# Patient Record
Sex: FEMALE | Race: WHITE | Employment: FULL TIME | URBAN - METROPOLITAN AREA
[De-identification: names, ages, dates, MRNs, and addresses within clinical notes are randomized per-mention and may not be internally consistent; named-entity substitution may affect disease eponyms.]

---

## 2018-01-30 ENCOUNTER — HOSPITAL ENCOUNTER (EMERGENCY)
Age: 28
Discharge: HOME | End: 2018-01-30
Attending: EMERGENCY MEDICINE
Payer: MEDICAID

## 2018-01-30 ENCOUNTER — APPOINTMENT (OUTPATIENT)
Dept: ULTRASOUND IMAGING | Age: 28
End: 2018-01-30
Attending: PHYSICIAN ASSISTANT
Payer: MEDICAID

## 2018-01-30 VITALS
SYSTOLIC BLOOD PRESSURE: 113 MMHG | RESPIRATION RATE: 16 BRPM | DIASTOLIC BLOOD PRESSURE: 80 MMHG | WEIGHT: 182.54 LBS | HEART RATE: 99 BPM | TEMPERATURE: 98.2 F | HEIGHT: 64 IN | OXYGEN SATURATION: 100 % | BODY MASS INDEX: 31.16 KG/M2

## 2018-01-30 DIAGNOSIS — Z3A.01 LESS THAN 8 WEEKS GESTATION OF PREGNANCY: ICD-10-CM

## 2018-01-30 DIAGNOSIS — Z32.01 PREGNANCY TEST PERFORMED, PREGNANCY CONFIRMED: Primary | ICD-10-CM

## 2018-01-30 DIAGNOSIS — Z71.6 TOBACCO ABUSE COUNSELING: ICD-10-CM

## 2018-01-30 DIAGNOSIS — Z86.59 HISTORY OF DEPRESSION: ICD-10-CM

## 2018-01-30 LAB
AMORPH CRY URNS QL MICRO: ABNORMAL
APPEARANCE UR: ABNORMAL
BACTERIA URNS QL MICRO: NEGATIVE /HPF
BILIRUB UR QL: NEGATIVE
CLUE CELLS VAG QL WET PREP: NORMAL
COLOR UR: ABNORMAL
EPITH CASTS URNS QL MICRO: ABNORMAL /LPF
GLUCOSE UR STRIP.AUTO-MCNC: NEGATIVE MG/DL
HCG SERPL-ACNC: ABNORMAL MIU/ML (ref 0–6)
HCG UR QL: POSITIVE
HGB UR QL STRIP: NEGATIVE
KETONES UR QL STRIP.AUTO: NEGATIVE MG/DL
KOH PREP SPEC: NORMAL
LEUKOCYTE ESTERASE UR QL STRIP.AUTO: NEGATIVE
MUCOUS THREADS URNS QL MICRO: ABNORMAL /LPF
NITRITE UR QL STRIP.AUTO: NEGATIVE
PH UR STRIP: 7 [PH] (ref 5–8)
PROT UR STRIP-MCNC: NEGATIVE MG/DL
RBC #/AREA URNS HPF: ABNORMAL /HPF (ref 0–5)
SERVICE CMNT-IMP: NORMAL
SP GR UR REFRACTOMETRY: 1.02 (ref 1–1.03)
T VAGINALIS VAG QL WET PREP: NORMAL
UA: UC IF INDICATED,UAUC: ABNORMAL
UROBILINOGEN UR QL STRIP.AUTO: 1 EU/DL (ref 0.2–1)
WBC URNS QL MICRO: ABNORMAL /HPF (ref 0–4)

## 2018-01-30 PROCEDURE — 76817 TRANSVAGINAL US OBSTETRIC: CPT

## 2018-01-30 PROCEDURE — 87491 CHLMYD TRACH DNA AMP PROBE: CPT | Performed by: PHYSICIAN ASSISTANT

## 2018-01-30 PROCEDURE — 87210 SMEAR WET MOUNT SALINE/INK: CPT | Performed by: PHYSICIAN ASSISTANT

## 2018-01-30 PROCEDURE — 36415 COLL VENOUS BLD VENIPUNCTURE: CPT | Performed by: PHYSICIAN ASSISTANT

## 2018-01-30 PROCEDURE — 81025 URINE PREGNANCY TEST: CPT | Performed by: PHYSICIAN ASSISTANT

## 2018-01-30 PROCEDURE — 99284 EMERGENCY DEPT VISIT MOD MDM: CPT

## 2018-01-30 PROCEDURE — 84702 CHORIONIC GONADOTROPIN TEST: CPT | Performed by: PHYSICIAN ASSISTANT

## 2018-01-30 PROCEDURE — 81001 URINALYSIS AUTO W/SCOPE: CPT | Performed by: PHYSICIAN ASSISTANT

## 2018-01-30 NOTE — Clinical Note
[facility] SYSTEMS Departments For adult and child immunizations, family planning, TB screening, STD testing and women's health services. [facility]: [place] [phone] [place] [phone] [facility]ly [facility]   [phone] [facility]   [phone] [facility]   [phone] [place]: [facility] [phone] [place] [phone] [place] [phone] [address] For primary care services, woman and child wellness, and some clinic s providing specialty care. [facility] -- 1011 [facility]. [address] [place] [address] [phone] [address] [place] [address] 200 [facility] [address] [place] [address] [phone] [address] [phone] [facility]8 [facility] 5850 [facility]  [phone] 7700 [facility] [address] [place] [phone] [facility] 81 [facility] [phone] [facility] 1051 [facility], 809 [facility]: [facility] [address], ext 320 1509 [facility] [address] [phone] [place] [address] [phone] [address] 5850 [facility]  [phone] [employer]  [address], [phone] 3550 [facility] ([email or web address]) [phone] Sexual Health/Woman Wellness Clinics For STD/HIV testing and treatment, pregnancy testing and services, men's health, birth control services, LGBT services, and hepatitis/HPV vaccine services. Danielle for [place] [employer] 201 [address]. [facility] [phone] [facility] [address] [facility] [phone] [address] [facility] [phone] [address] [place] [address] Rd, 5th floor [phone] [facility] o myriam [place] 5841 [facility] [phone] [facility] [address] [facility] [address]. Rehana [place] [phone] Specialty Service Clinics 112 [facility] [phone] 6655 [facility]   [phone] [place]   [phone] Women[facility] Inf[facility] Children[facility] Services: [address] [phone] [address] [phone] 128 [facility] 85 7926 [facility]     [phone] 1325 [facility]   [phone] 561 [facility] [phone] [facility] [address] [place] [address] Physicians [phone] MD Storm Zamora MD Pascal Blanks, MD Boyd Estrin [facility] Doctors [phone] Tahir Velasquez, P MD Jairo Quintana MD Dallie Reek, MD 
[place] [address] [phone] Karla Ebony, MD Saray Estradahine [facility] [phone] MD Andrés Key MD Brinda Leep, MD Allyn Boyer, MD 
[facility] [phone] MD Randi Leung MD [place], DELFINO Graham [phone] MD Jassi Calero MD Jeoffrey Coria, M D Seward Kettle, MD Lenora Martinez, MD Suzie Campi, MD Celena Spalding, [address] [place] [address] [phone] Jase Matta MD [facility] [phone] MD Steve Umaña, MD Elke Hernandez MD Emily Blamer, MD Eliza Roy, MD Ed Bonus, MD 
[facility] [phone] MD Luz Arias, FNP Danny Suarez, MD Jerome Levi MD Denney Gourd, MD Ola Lay, MD [facility] [phone] MD Douglas Underwood MD Nino Philips, MD Adela Guerra Si, MD 
[facility] [phone] MD Adrian Laureano MD [facility] [place] [phone] MD Chasity Camacho, MD Silvia Landry MD 
6260 [facility] [phone] Bell Allen, MD Svitlana Garrett, MD Mikey Runner, MD Glen Jackson, MD Maite Celis, MD Jami Lake, NP Bev Diaz MD 9978 [facility], [facility] [phone] MD John French, MD [place] [facility] [phone] MD Darron Dey, FNP Minh May, PACLAUDIO May,  FNP Bipin Coronado, MD Edwige Malin, NP Ines Bermudez, DO Miscellaneous: 
Litzy Alston [phone]

## 2018-01-30 NOTE — DISCHARGE INSTRUCTIONS
Getting Ready for Baby: Care Instructions  Your Care Instructions    Congratulations! You are heading into an exciting adventure. You can make your entry into parenthood a little less hectic by planning now and gathering some of the items you will need. You will be too busy (and probably too tired) to do much shopping after the baby arrives. These tips will help you get started. Some items you may need to buy, but do not be shy about taking donations of clothes and other items from family and friends. Getting an early start can allow you to stock up over time, which might be easier on your wallet. Follow-up care is a key part of your treatment and safety. Be sure to make and go to all appointments, and call your doctor if you are having problems. What do you need to have at home? Freeze meals ahead  · Try to cook and freeze meals in the weeks before the baby comes. Family and friends may be able to help cook meals. You may not have the time or the energy to cook in the first weeks after the baby arrives. Baby furniture and car seat  · Make sure all the baby gear you buy meets safety standards set by the [place] Consumer Product Safety Commission. Although most new items will likely meet these standards, older and used items may not. Equipment that has been used before may not be safe. ¨ Cribs should have less than 2.4 inches of space between the slats. Lower the mattress as your baby grows. Your baby may try to climb out of the crib if the mattress is not lowered. ¨ Baby walkers should not be used, according to recommendations from the [employer] of [employer]. ¨ Playpens should have spaces in the mesh material that are no greater than ¼-inch across. Wood slats should be less than 2.4 inches apart. Look for a playpen or travel crib that has top rails that lock into position. This may prevent the rail from collapsing.   ¨ Stroller wheels should be in a locked position before you put your baby in the stroller. Use the straps to secure your baby so that your baby cannot lean out as he or she gets more mobile. Fasten any toys or bumpers so that they do not fall on your baby. Remove these items as soon as your infant can sit or get up on all fours. Make sure releases and hinges are out of your baby's reach, especially if the stroller can fold. ¨ High chairs should have a wide, stable base. Do not use booster seats that attach to the table. Always make sure the high chair is locked in the upright position before use. Use the safety straps, and stay with your baby at all times while he or she is in the high chair. ¨ Changing tables should have a railing on all sides that is 2 inches high. Try to use a slightly indented changing pad. Always use the safety strap, and keep one hand on your baby. Have diapers and other items handy, but keep them out of your baby's reach. (If you do not have a changing table, you can change your baby on a towel on the floor.)  · Get a new car seat and put your baby in it every time you drive with him or her. Getting a new seat is the best way to make sure that a seat meets safety standards and has not already been used in an accident. ¨ Make sure the car seat is properly installed. See the maker's instructions. If you are not sure how to put in the car seat, have your car seat checked at a police station. ¨ Make sure the car seat is the right fit for your child's current age, weight, or height. For safety, it is very important to have a car seat that fits your child. And it is safest for the seat to face the rear until your child is age 3 or nearly 2.  ¨ Put the car seat in a rear seat, not in the front passenger seat. This will keep your child from getting hurt by the air bag in an accident. If you have rear side air bags, put your child's car seat in the middle seat.   ¨ For more information about car seats, call the [employer] at 1-421-684-491-269-9939. Baby care items  · Start stocking up on disposable diapers (unless you plan to use cloth diapers) and wipes. If you want, you can buy a few packages of  diapers, which come with a cutout in the front so the diaper does not touch the baby's umbilical cord stump. You also can buy regular infant diapers and roll down the front. · You may get some baby clothes from family and friends at baby showers and after the baby arrives. Ask for (or buy) a few basics to get you started. Get several sleep sacks or nightgowns, T-shirts that snap at the crotch (called \"onesies\"), small blankets to wrap the baby in (called receiving blankets), and one-piece outfits that snap or zip down one leg. This is so that you do not have to take off all the baby's clothes to change a diaper. Socks or booties are also a good idea to keep your baby's feet warm. Get a cotton cap or two. Newborns also need their heads covered to stay warm. · Put together a kit of baby care items. Include diaper rash cream, baby nail clippers, a nasal bulb syringe (to help clear a stuffy nose), and baby wash, which also can be used as shampoo. · If you plan to breastfeed, buy a couple of nursing bras. You can wear one while the other one is in the wash. Also, get a nipple cream that contains lanolin to prevent cracked or sore nipples. Some women also like to put nursing pads in their bras to prevent breast-milk from leaking onto their clothes. · If you plan to bottle-feed, buy several cans of formula and several bottles to get you started. Where can you learn more? Go to http://queta-maxine.info/. Enter O361 in the search box to learn more about \"Getting Ready for Baby: Care Instructions. \"  Current as of: May 12, 2017  Content Version: 11.4  © 0717-7596 [employer]. Care instructions adapted under license by [employer] (which disclaims liability or warranty for this information).  If you have questions about a medical condition or this instruction, always ask your healthcare professional. [address] any warranty or liability for your use of this information. Learning About Pregnancy  Your Care Instructions    Your health in the early weeks of your pregnancy is particularly important for your baby's health. Take good care of yourself. Anything you do that harms your body can also harm your baby. Make sure to go to all of your doctor appointments. Regular checkups will help keep you and your baby healthy. How can you care for yourself at home? Diet  ? · Eat a balanced diet. Make sure your diet includes plenty of beans, peas, and leafy green vegetables. ? · Do not skip meals or go for many hours without eating. If you are nauseated, try to eat a small, healthy snack every 2 to 3 hours. ? · Do not eat fish that has a high level of mercury, such as shark, swordfish, or mackerel. Do not eat more than one can of tuna each week. ? · Drink plenty of fluids, enough so that your urine is light yellow or clear like water. If you have kidney, heart, or liver disease and have to limit fluids, talk with your doctor before you increase the amount of fluids you drink. ? · Cut down on caffeine, such as coffee, tea, and cola. ? · Do not drink alcohol, such as beer, wine, or hard liquor. ? · Take a multivitamin that contains at least 400 micrograms (mcg) of folic acid to help prevent birth defects. Fortified cereal and whole wheat bread are good additional sources of folic acid. ? · Increase the calcium in your diet. Try to drink a quart of skim milk each day. You may also take calcium supplements and choose foods such as cheese and yogurt. ? Lifestyle  ? · Make sure you go to your follow-up appointments. ? · Get plenty of rest. You may be unusually tired while you are pregnant. ? · Get at least 30 minutes of exercise on most days of the week. Walking is a good choice.  If you have not exercised in the past, start out slowly. Take several short walks each day. ? · Do not smoke. If you need help quitting, talk to your doctor about stop-smoking programs. These can increase your chances of quitting for good. ? · Do not touch cat feces or litter boxes. Also, wash your hands after you handle raw meat, and fully cook all meat before you eat it. Wear gloves when you work in the yard or garden, and wash your hands well when you are done. Cat feces, raw or undercooked meat, and contaminated dirt can cause an infection that may harm your baby or lead to a miscarriage. ? · Do not use saunas or hot tubs. Raising your body temperature may harm your baby. ? · Avoid chemical fumes, paint fumes, or poisons. ? · Do not use illegal drugs or alcohol. Medicines  ? · Review all of your medicines with your doctor. Some of your routine medicines may need to be changed to protect your baby. ? · Use acetaminophen (Tylenol) to relieve minor problems, such as a mild headache or backache or a mild fever with cold symptoms. Do not use nonsteroidal anti-inflammatory drugs (NSAIDs), such as ibuprofen (Advil, Motrin) or naproxen (Aleve), unless your doctor says it is okay. ? · Do not take two or more pain medicines at the same time unless the doctor told you to. Many pain medicines have acetaminophen, which is Tylenol. Too much acetaminophen (Tylenol) can be harmful. ? · Take your medicines exactly as prescribed. Call your doctor if you think you are having a problem with your medicine. ?To manage morning sickness  ? · If you feel sick when you first wake up, try eating a small snack (such as crackers) before you get out of bed. Allow some time to digest the snack, and then get out of bed slowly. ? · Do not skip meals or go for long periods without eating. An empty stomach can make nausea worse. ? · Eat small, frequent meals instead of three large meals each day. ? · Drink plenty of fluids. Sports drinks, such as Gatorade or Powerade, are good choices. ? · Eat foods that are high in protein but low in fat. ? · If you are taking iron supplements, ask your doctor if they are necessary. Iron can make nausea worse. ? · Avoid any smells, such as coffee, that make you feel sick. ? · Get lots of rest. Morning sickness may be worse when you are tired. Follow-up care is a key part of your treatment and safety. Be sure to make and go to all appointments, and call your doctor if you are having problems. It's also a good idea to know your test results and keep a list of the medicines you take. Where can you learn more? Go to http://queta-maxine.info/. Enter Y223 in the search box to learn more about \"Learning About Pregnancy. \"  Current as of: March 16, 2017  Content Version: 11.4  © 7430-9003 [employer]. Care instructions adapted under license by [employer] (which disclaims liability or warranty for this information). If you have questions about a medical condition or this instruction, always ask your healthcare professional. [address] any warranty or liability for your use of this information.

## 2018-01-30 NOTE — ED PROVIDER NOTES
EMERGENCY DEPARTMENT HISTORY AND PHYSICAL EXAM      Date: 2018  Patient Name: Yuli Alarcon    History of Presenting Illness     Chief Complaint   Patient presents with    Pregnancy Test     Patient reports having a positive pregnancy at home and wants it confirmed today. History Provided By: Patient    HPI: Yuli Alarcon, 33 y.o. female (/A0) with PMHx significant for drug addiction, EtOH abuse, and hepatitis C, presents ambulatory to the ED with cc of mild, cramping, lower abdominal pain x 1 week. She also c/o two episodes of vaginal bleeding over the past 1.5 weeks. She states that she visualized blood after wiping, but she denies using panty-liners or pads. She also c/o minimal vaginal discharge, but she denies any abnormal color/odor. She notes that she recently gave vaginal birth 3 months ago in [place], and recently moved to [place] 1 month ago. She denies any complications with her previous pregnancy. She states that she had a few positive home pregnancy tests, but she sought ED evaluation today for confirmation. She denies receiving a menstrual cycle after her previous pregnancy. She also denies having PCP or an OB/GYN established in [place]. She is unsure how far along she is in her current pregnancy. She denies a history of miscarriages, taking birth control after her recent vaginal delivery, exposure to STIs, or currently breast feeding. She specifically denies fevers, dysuria, hematuria, or other complaints at this time. There are no other complaints, changes, or physical findings at this time. PCP: None    Current Outpatient Prescriptions   Medication Sig Dispense Refill    acetaminophen (TYLENOL EXTRA STRENGTH) 500 mg tablet Take 2 Tabs by mouth every six (6) hours as needed for Pain or Fever. 30 Tab 0    ibuprofen (MOTRIN) 600 mg tablet Take 1 Tab by mouth every six (6) hours as needed for Pain (Fever).  20 Tab 0    fluoxetine (PROZAC) 10 mg capsule Take 10 mg by mouth daily.  lithium carbonate (LITHOBID) 600 mg capsule Take 600 mg by mouth daily.  FLUPHENAZINE HCL (PROLIXIN PO) Take  by mouth. 2 tablets at bedtime       ziprasidone (GEODON) 40 mg capsule Take 40 mg by mouth two (2) times daily (with meals).  BENZTROPINE MESYLATE (BENZTROPINE PO) Take  by mouth. One tablet in the AM and 2 at bedtime          Past History     Past Medical History:  Past Medical History:   Diagnosis Date    [facility]     [facility]     Hepatitis C     Psychiatric disorder     Psychiatric disorder        Past Surgical History:  Past Surgical History:   Procedure Laterality Date    HX ACL RECONSTRUCTION      right knee    HX TONSILLECTOMY      INNER EAR SURGERY PROC UNLISTED         Family History:  History reviewed. No pertinent family history. Social History:  Social History   Substance Use Topics    Smoking status: Current Every Day Smoker    Smokeless tobacco: Never Used    Alcohol use No      Comment: No alcohol in 4 months       Allergies: Allergies   Allergen Reactions    Tylenol [Acetaminophen] Other (comments)     Hepatitis        Review of Systems   Review of Systems   Constitutional: Negative. Negative for activity change, appetite change, chills, diaphoresis, fever and unexpected weight change. HENT: Negative for congestion, hearing loss, rhinorrhea, sinus pressure, sneezing, sore throat and trouble swallowing. Eyes: Negative for pain, redness, itching and visual disturbance. Respiratory: Negative for cough, shortness of breath and wheezing. Cardiovascular: Negative for chest pain, palpitations and leg swelling. Gastrointestinal: Positive for abdominal pain. Negative for constipation, diarrhea, nausea and vomiting. Genitourinary: Positive for vaginal bleeding and vaginal discharge. Negative for dysuria and hematuria. Musculoskeletal: Negative for arthralgias, gait problem and myalgias.    Skin: Negative for color change, pallor, rash and wound. Neurological: Negative for tremors, weakness, light-headedness, numbness and headaches. All other systems reviewed and are negative. Physical Exam   Physical Exam   Constitutional: She is oriented to person, place, and time. Vital signs are normal. She appears well-developed and well-nourished. No distress. 33 y.o.  female in NAD  Communicates appropriately and in full sentences  Normal vital signs   HENT:   Head: Normocephalic and atraumatic. Eyes: Conjunctivae are normal. Pupils are equal, round, and reactive to light. Right eye exhibits no discharge. Left eye exhibits no discharge. Neck: Normal range of motion. Neck supple. No nuchal rigidity   Cardiovascular: Normal rate, regular rhythm and intact distal pulses. Pulmonary/Chest: Effort normal and breath sounds normal. No respiratory distress. She has no wheezes. Abdominal: Soft. Bowel sounds are normal. She exhibits no distension. There is no tenderness. Genitourinary:   Genitourinary Comments: Pelvic Exam: No vaginal bleeding. Cervical os closed. Moderate amount of thick, white vaginal discharge. No CMT, no adnexal tenderness. Musculoskeletal: Normal range of motion. She exhibits no edema, tenderness or deformity. No neurologic, motor, vascular, or compartment embarrassment observed on exam. No focal neurologic deficits. Neurological: She is alert and oriented to person, place, and time. Coordination normal.   Skin: Skin is warm and dry. No rash noted. She is not diaphoretic. No erythema. No pallor. Psychiatric: She has a normal mood and affect. Nursing note and vitals reviewed.       Diagnostic Study Results   Labs -     Recent Results (from the past 12 hour(s))   URINALYSIS W/ REFLEX CULTURE    Collection Time: 01/30/18 12:58 PM   Result Value Ref Range    Color YELLOW/STRAW      Appearance CLOUDY (A) CLEAR      Specific gravity 1.022 1.003 - 1.030      pH (UA) 7.0 5.0 - 8.0 Protein NEGATIVE  NEG mg/dL    Glucose NEGATIVE  NEG mg/dL    Ketone NEGATIVE  NEG mg/dL    Bilirubin NEGATIVE  NEG      Blood NEGATIVE  NEG      Urobilinogen 1.0 0.2 - 1.0 EU/dL    Nitrites NEGATIVE  NEG      Leukocyte Esterase NEGATIVE  NEG      WBC 0-4 0 - 4 /hpf    RBC 0-5 0 - 5 /hpf    Epithelial cells FEW FEW /lpf    Bacteria NEGATIVE  NEG /hpf    UA:UC IF INDICATED CULTURE NOT INDICATED BY UA RESULT CNI      Mucus TRACE (A) NEG /lpf    Amorphous Crystals 2+ (A) NEG   HCG URINE, QL    Collection Time: 01/30/18 12:58 PM   Result Value Ref Range    HCG urine, QL POSITIVE (A) NEG     BETA HCG, QT    Collection Time: 01/30/18  1:28 PM   Result Value Ref Range    [place] [address] (H) 0 - 6 MIU/ML   OTTONIEL, OTHER SOURCES    Collection Time: 01/30/18  1:50 PM   Result Value Ref Range    Special Requests: NO SPECIAL REQUESTS      KOH NO YEAST SEEN     WET PREP    Collection Time: 01/30/18  1:50 PM   Result Value Ref Range    Clue cells CLUE CELLS ABSENT      Wet prep NO TRICHOMONAS SEEN         Radiologic Studies -   INDICATION: Vaginal bleeding and contractions     COMPARISON: none     The patient was studied with real-time ultrasound using transvaginal technique  only. . Examination was performed as an emergency procedure. Fetal examination  was not performed.        Transvaginal:     The gestational sac shape and fluid within it are appropriate for gestational  age. Fetal pole is seen. Crown rump length is 2 mm corresponding to  approximately 5 weeks 5 days. The gestational sac averages 1.3 cm correlating  with 5 weeks 3 days gestational age. . Cardiac flicker is 644 bpm.  Placental  implantation site cannot be defined since the gestation is early. Uterine size  is 8.9 x 4.7 x 5.8 cm. Right ovary is 1.7 x 2.0 x 1.3 cm.   Left ovary is 3.1 x  2.3 x 2.1 cm.     No abnormal fluid collections seen.     IMPRESSION  IMPRESSION:   Living intrauterine gestation with best estimate of gestational age at  approximately 10 weeks 11 days.  No complication of pregnancy seen.                    Medical Decision Making   I am the first provider for this patient. I reviewed the vital signs, available nursing notes, past medical history, past surgical history, family history and social history. Vital Signs-Reviewed the patient's vital signs. Patient Vitals for the past 12 hrs:   Temp Pulse Resp BP SpO2   01/30/18 1249 98.2 °F (36.8 °C) 99 16 113/80 100 %       Records Reviewed: Nursing Notes and Old Medical Records    Provider Notes (Medical Decision Making):   DDx: Pregnancy, threatened AB, ectopic pregnancy, ovarian cyst, hemorrhagic cyst, BV, chlamydia, gonorrhea, trichomoniasis, PID, UTI. Pt presents with deire for confirmation of pregnancy. Pregnancy test positive. Pt also reports ?vaginal bleeding and lower cramping. HCG quant and ultrasound ordered. No acute findings. Pt appears comfortable on exam and is in no abdominal pain. Will discharge with close OB follow-up. Pt provided with print-out of her results so she can acquire Medicare insurance for her pregnancy. Urged pt to quit smoking and begin taking prenatal vitamins. ED Course:   Initial assessment performed. The patients presenting problems have been discussed, and they are in agreement with the care plan formulated and outlined with them. I have encouraged them to ask questions as they arise throughout their visit. Progress Note:  1:25 PM  The patient was informed of her positive pregnancy test, and she conveys her understanding of this result. Given her abdominal cramping and vaginal bleeding, pt was informed of the plan for an ultrasound and pelvic exam. She is in agreement with this plan. Written by KIKO Connolly, as dictated by Farnaz Longoria PA-C. Procedure Note - Pelvic Exam:    1:50 PM  Performed by: Farnaz Longoria PA-C  Chaperoned by: ED RN  Pelvic exam was performed using bimanual and speculum.  Further findings noted in physical exam. The procedure took 1-15 minutes, and pt tolerated well. Progress Note:  3:20 PM  At time of discharge, the patient was informed of her ultrasound results. She conveys her understanding of these results. Pt was encouraged to establish follow up with OB/GYN, and she conveys her understanding of this recommendation. Pt was given a list of local OB/GYN offices. Pt was given customary return precautions. Pt is stable for discharge. Written by Yohana Zhu, ED Scribe, as dictated by Nohemi Vincent PA-C. Critical Care Time: 0 minutes    Discharge Note:  3:21 PM  The pt is ready for discharge. The pt's signs, symptoms, diagnosis, and discharge instructions have been discussed and pt has conveyed their understanding. The pt is to follow up as recommended or return to ER should their symptoms worsen. Plan has been discussed and pt is in agreement. PLAN:  1. Discharge Medication List as of 1/30/2018  3:18 PM        2. Follow-up Information     Follow up With Details Comments Contact Info    None Schedule an appointment as soon as possible for a visit in 2 days As needed, If symptoms worsen, Possible further evaluation and treatment None [phone] Patient stated that they have no PCP      [facility] EM[facility]PT Go to As needed, If symptoms worsen [facility]     [address] [place] [address] Schedule an appointment as soon as possible for a visit in 2 days As needed, If symptoms worsen, Possible further evaluation and treatment [id number] [address] [facility] an appointment as soon as possible for a visit in 2 days As needed, If symptoms worsen, Possible further evaluation and treatment [phone] [facility] [address]          Return to ED if worse     Diagnosis     Clinical Impression:   1. Pregnancy test performed, pregnancy confirmed    2.  Less than 8 weeks gestation of pregnancy    3. Tobacco abuse counseling    4. History of depression        Attestations: This note is prepared by Amparo Vital, acting as a Scribe for [place], [employer]. [employer], PA-C: The scribe's documentation has been prepared under my direction and personally reviewed by me in its entirety. I confirm that the notes above accurately reflects all work, treatment, procedures, and medical decision making performed by me.    2:22 PM  I was personally available for consultation in the emergency department. I have reviewed the chart and agree with the documentation recorded by the [facility], including the assessment, treatment plan, and disposition. Cherelle Kwon MD          This note will not be viewable in [address]

## 2018-01-31 LAB
C TRACH DNA SPEC QL NAA+PROBE: NEGATIVE
N GONORRHOEA DNA SPEC QL NAA+PROBE: NEGATIVE
SAMPLE TYPE: NORMAL
SERVICE CMNT-IMP: NORMAL
SPECIMEN SOURCE: NORMAL

## 2018-03-07 ENCOUNTER — HOSPITAL ENCOUNTER (EMERGENCY)
Age: 28
Discharge: HOME | End: 2018-03-07
Attending: EMERGENCY MEDICINE
Payer: MEDICAID

## 2018-03-07 VITALS
OXYGEN SATURATION: 99 % | DIASTOLIC BLOOD PRESSURE: 82 MMHG | TEMPERATURE: 97.8 F | HEART RATE: 97 BPM | HEIGHT: 64 IN | BODY MASS INDEX: 31.35 KG/M2 | RESPIRATION RATE: 20 BRPM | SYSTOLIC BLOOD PRESSURE: 124 MMHG | WEIGHT: 183.64 LBS

## 2018-03-07 DIAGNOSIS — R19.7 DIARRHEA, UNSPECIFIED TYPE: Primary | ICD-10-CM

## 2018-03-07 DIAGNOSIS — Z3A.12 12 WEEKS GESTATION OF PREGNANCY: ICD-10-CM

## 2018-03-07 LAB
ALBUMIN SERPL-MCNC: 3.4 G/DL (ref 3.5–5)
ALBUMIN/GLOB SERPL: 0.9 {RATIO} (ref 1.1–2.2)
ALP SERPL-CCNC: 81 U/L (ref 45–117)
ALT SERPL-CCNC: 84 U/L (ref 12–78)
ANION GAP SERPL CALC-SCNC: 9 MMOL/L (ref 5–15)
APPEARANCE UR: CLEAR
AST SERPL-CCNC: 32 U/L (ref 15–37)
BACTERIA URNS QL MICRO: NEGATIVE /HPF
BASOPHILS # BLD: 0 K/UL (ref 0–0.1)
BASOPHILS NFR BLD: 0 % (ref 0–1)
BILIRUB SERPL-MCNC: 0.2 MG/DL (ref 0.2–1)
BILIRUB UR QL: NEGATIVE
BUN SERPL-MCNC: 11 MG/DL (ref 6–20)
BUN/CREAT SERPL: 19 (ref 12–20)
CALCIUM SERPL-MCNC: 8.6 MG/DL (ref 8.5–10.1)
CHLORIDE SERPL-SCNC: 104 MMOL/L (ref 97–108)
CO2 SERPL-SCNC: 26 MMOL/L (ref 21–32)
COLOR UR: NORMAL
CREAT SERPL-MCNC: 0.57 MG/DL (ref 0.55–1.02)
DIFFERENTIAL METHOD BLD: NORMAL
EOSINOPHIL # BLD: 0.1 K/UL (ref 0–0.4)
EOSINOPHIL NFR BLD: 2 % (ref 0–7)
EPITH CASTS URNS QL MICRO: NORMAL /LPF
ERYTHROCYTE [DISTWIDTH] IN BLOOD BY AUTOMATED COUNT: 13.4 % (ref 11.5–14.5)
GLOBULIN SER CALC-MCNC: 4 G/DL (ref 2–4)
GLUCOSE SERPL-MCNC: 75 MG/DL (ref 65–100)
GLUCOSE UR STRIP.AUTO-MCNC: NEGATIVE MG/DL
HCT VFR BLD AUTO: 37.9 % (ref 35–47)
HGB BLD-MCNC: 12.3 G/DL (ref 11.5–16)
HGB UR QL STRIP: NEGATIVE
HYALINE CASTS URNS QL MICRO: NORMAL /LPF (ref 0–5)
IMM GRANULOCYTES # BLD: 0 K/UL (ref 0–0.04)
IMM GRANULOCYTES NFR BLD AUTO: 0 % (ref 0–0.5)
KETONES UR QL STRIP.AUTO: NEGATIVE MG/DL
LEUKOCYTE ESTERASE UR QL STRIP.AUTO: NEGATIVE
LYMPHOCYTES # BLD: 2.3 K/UL (ref 0.8–3.5)
LYMPHOCYTES NFR BLD: 30 % (ref 12–49)
MCH RBC QN AUTO: 29.1 PG (ref 26–34)
MCHC RBC AUTO-ENTMCNC: 32.5 G/DL (ref 30–36.5)
MCV RBC AUTO: 89.8 FL (ref 80–99)
MONOCYTES # BLD: 0.4 K/UL (ref 0–1)
MONOCYTES NFR BLD: 6 % (ref 5–13)
NEUTS SEG # BLD: 4.8 K/UL (ref 1.8–8)
NEUTS SEG NFR BLD: 63 % (ref 32–75)
NITRITE UR QL STRIP.AUTO: NEGATIVE
NRBC # BLD: 0 K/UL (ref 0–0.01)
NRBC BLD-RTO: 0 PER 100 WBC
PH UR STRIP: 5.5 [PH] (ref 5–8)
PLATELET # BLD AUTO: 242 K/UL (ref 150–400)
PMV BLD AUTO: 10.7 FL (ref 8.9–12.9)
POTASSIUM SERPL-SCNC: 3.2 MMOL/L (ref 3.5–5.1)
PROT SERPL-MCNC: 7.4 G/DL (ref 6.4–8.2)
PROT UR STRIP-MCNC: NEGATIVE MG/DL
RBC # BLD AUTO: 4.22 M/UL (ref 3.8–5.2)
RBC #/AREA URNS HPF: NORMAL /HPF (ref 0–5)
SODIUM SERPL-SCNC: 139 MMOL/L (ref 136–145)
SP GR UR REFRACTOMETRY: 1.02 (ref 1–1.03)
UA: UC IF INDICATED,UAUC: NORMAL
UROBILINOGEN UR QL STRIP.AUTO: 0.2 EU/DL (ref 0.2–1)
WBC # BLD AUTO: 7.7 K/UL (ref 3.6–11)
WBC URNS QL MICRO: NORMAL /HPF (ref 0–4)

## 2018-03-07 PROCEDURE — 93005 ELECTROCARDIOGRAM TRACING: CPT

## 2018-03-07 PROCEDURE — 36415 COLL VENOUS BLD VENIPUNCTURE: CPT | Performed by: EMERGENCY MEDICINE

## 2018-03-07 PROCEDURE — 74011250637 HC RX REV CODE- 250/637: Performed by: EMERGENCY MEDICINE

## 2018-03-07 PROCEDURE — 99285 EMERGENCY DEPT VISIT HI MDM: CPT

## 2018-03-07 PROCEDURE — 85025 COMPLETE CBC W/AUTO DIFF WBC: CPT | Performed by: EMERGENCY MEDICINE

## 2018-03-07 PROCEDURE — 81001 URINALYSIS AUTO W/SCOPE: CPT | Performed by: EMERGENCY MEDICINE

## 2018-03-07 PROCEDURE — 80053 COMPREHEN METABOLIC PANEL: CPT | Performed by: EMERGENCY MEDICINE

## 2018-03-07 RX ORDER — FAMOTIDINE 20 MG/1
20 TABLET, FILM COATED ORAL 2 TIMES DAILY
Status: DISCONTINUED | OUTPATIENT
Start: 2018-03-07 | End: 2018-03-08 | Stop reason: HOSPADM

## 2018-03-07 RX ADMIN — FAMOTIDINE 20 MG: 20 TABLET, FILM COATED ORAL at 20:57

## 2018-03-08 LAB
ATRIAL RATE: 100 BPM
CALCULATED P AXIS, ECG09: 34 DEGREES
CALCULATED R AXIS, ECG10: 48 DEGREES
CALCULATED T AXIS, ECG11: 7 DEGREES
DIAGNOSIS, 93000: NORMAL
P-R INTERVAL, ECG05: 146 MS
Q-T INTERVAL, ECG07: 352 MS
QRS DURATION, ECG06: 80 MS
QTC CALCULATION (BEZET), ECG08: 454 MS
VENTRICULAR RATE, ECG03: 100 BPM

## 2018-03-08 NOTE — DISCHARGE INSTRUCTIONS
Diarrhea: Care Instructions  Your Care Instructions    Diarrhea is loose, watery stools (bowel movements). The exact cause is often hard to find. Sometimes diarrhea is your body's way of getting rid of what caused an upset stomach. Viruses, food poisoning, and many medicines can cause diarrhea. Some people get diarrhea in response to emotional stress, anxiety, or certain foods. Almost everyone has diarrhea now and then. It usually isn't serious, and your stools will return to normal soon. The important thing to do is replace the fluids you have lost, so you can prevent dehydration. The doctor has checked you carefully, but problems can develop later. If you notice any problems or new symptoms, get medical treatment right away. Follow-up care is a key part of your treatment and safety. Be sure to make and go to all appointments, and call your doctor if you are having problems. It's also a good idea to know your test results and keep a list of the medicines you take. How can you care for yourself at home? · Watch for signs of dehydration, which means your body has lost too much water. Dehydration is a serious condition and should be treated right away. Signs of dehydration are:  ¨ Increasing thirst and dry eyes and mouth. ¨ Feeling faint or lightheaded. ¨ Darker urine, and a smaller amount of urine than normal.  · To prevent dehydration, drink plenty of fluids, enough so that your urine is light yellow or clear like water. Choose water and other caffeine-free clear liquids until you feel better. If you have kidney, heart, or liver disease and have to limit fluids, talk with your doctor before you increase the amount of fluids you drink. · Begin eating small amounts of mild foods the next day, if you feel like it. ¨ Try yogurt that has live cultures of Lactobacillus. (Check the label.)  ¨ Avoid spicy foods, fruits, alcohol, and caffeine until 48 hours after all symptoms are gone.   ¨ Avoid chewing gum that contains sorbitol. ¨ Avoid dairy products (except for yogurt with Lactobacillus) while you have diarrhea and for 3 days after symptoms are gone. · The doctor may recommend that you take over-the-counter medicine, such as loperamide (Imodium), if you still have diarrhea after 6 hours. Read and follow all instructions on the label. Do not use this medicine if you have bloody diarrhea, a high fever, or other signs of serious illness. Call your doctor if you think you are having a problem with your medicine. When should you call for help? Call 911 anytime you think you may need emergency care. For example, call if:  ? · You passed out (lost consciousness). ? · Your stools are maroon or very bloody. ?Call your doctor now or seek immediate medical care if:  ? · You are dizzy or lightheaded, or you feel like you may faint. ? · Your stools are black and look like tar, or they have streaks of blood. ? · You have new or worse belly pain. ? · You have symptoms of dehydration, such as:  ¨ Dry eyes and a dry mouth. ¨ Passing only a little dark urine. ¨ Feeling thirstier than usual.   ? · You have a new or higher fever. ? Watch closely for changes in your health, and be sure to contact your doctor if:  ? · Your diarrhea is getting worse. ? · You see pus in the diarrhea. ? · You are not getting better after 2 days (48 hours). Where can you learn more? Go to http://queta-maxine.info/. Enter J191 in the search box to learn more about \"Diarrhea: Care Instructions. \"  Current as of: March 20, 2017  Content Version: 11.4  © 9413-5804 [employer].. Care instructions adapted under license by [employer] (which disclaims liability or warranty for this information). If you have questions about a medical condition or this instruction, always ask your healthcare professional. [address] any warranty or liability for your use of this information. Weeks 10 to 14 of Your Pregnancy: Care Instructions  Your Care Instructions    By weeks 10 to 14 of your pregnancy, the placenta has formed inside your uterus. It is possible to hear your baby's heartbeat with a special ultrasound device. Your baby's eyes can and do move. The arms and legs can bend. This is a good time to think about testing for birth defects. There are two types of tests: screening and diagnostic. Screening tests show the chance that a baby has a certain birth defect. They can't tell you for sure that your baby has a problem. Diagnostic tests show if a baby has a certain birth defect. It's your choice whether to have these tests. You and your partner can talk to your doctor or midwife about birth defects tests. Follow-up care is a key part of your treatment and safety. Be sure to make and go to all appointments, and call your doctor if you are having problems. It's also a good idea to know your test results and keep a list of the medicines you take. How can you care for yourself at home? Decide about tests  · You can have screening tests and diagnostic tests to check for birth defects. The decision to have a test for birth defects is personal. Think about your age, your chance of passing on a family disease, your need to know about any problems, and what you might do after you have the test results. ¨ Triple or quadruple (quad) blood tests. These screening tests can be done between 15 and 20 weeks of pregnancy. They check the amounts of three or four substances in your blood. The doctor looks at these test results, along with your age and other factors, to find out the chance that your baby may have certain problems. ¨ Amniocentesis. This diagnostic test is used to look for chromosomal problems in the baby's cells.  It can be done between 15 and 20 weeks of pregnancy, usually around week 16.  ¨ Nuchal translucency test. This test uses ultrasound to measure the thickness of the area at the back of the baby's neck. An increase in the thickness can be an early sign of Down syndrome. ¨ Chorionic villus sampling (CVS). This is a test that looks for certain genetic problems with your baby. The same genes that are in your baby are in the placenta. A small piece of the placenta is taken out and tested. This test is done when you are 10 to 13 weeks pregnant. Ease discomfort  · Slow down and take naps when you feel tired. · If your emotions swing, talk to someone. Crying, anxiety, and concentration problems are common. · If your gums bleed, try a softer toothbrush. If your gums are puffy and bleed a lot, see your dentist.  · If you feel dizzy:  ¨ Get up slowly after sitting or lying down. ¨ Drink plenty of fluids. ¨ Eat small snacks to keep your blood sugar stable. ¨ Put your head between your legs as though you were tying your shoelaces. ¨ Lie down with your legs higher than your head. Use pillows to prop up your feet. · If you have a headache:  ¨ Lie down. ¨ Ask your partner or a good friend for a neck massage. ¨ Try cool cloths over your forehead or across the back of your neck. ¨ Use acetaminophen (Tylenol) for pain relief. Do not use nonsteroidal anti-inflammatory drugs (NSAIDs), such as ibuprofen (Advil, Motrin) or naproxen (Aleve), unless your doctor says it is okay. · If you have a nosebleed, pinch your nose gently, and hold it for a short while. To prevent nosebleeds, try massaging a small dab of petroleum jelly, such as Vaseline, in your nostrils. · If your nose is stuffed up, try saline (saltwater) nose sprays. Do not use decongestant sprays. Care for your breasts  · Wear a bra that gives you good support. · Know that changes in your breasts are normal.  ¨ Your breasts may get larger and more tender. Tenderness usually gets better by 12 weeks. ¨ Your nipples may get darker and larger, and small bumps around your nipples may show more.   ¨ The veins in your chest and breasts may show more.  · Don't worry about \"toughening'\" your nipples. Breastfeeding will naturally do this. Where can you learn more? Go to http://queta-maxine.info/. Enter H091 in the search box to learn more about \"Weeks 10 to 14 of Your Pregnancy: Care Instructions. \"  Current as of: March 16, 2017  Content Version: 11.4  © 4511-3472 [employer]. Care instructions adapted under license by [employer] (which disclaims liability or warranty for this information). If you have questions about a medical condition or this instruction, always ask your healthcare professional. [address] any warranty or liability for your use of this information.

## 2018-03-08 NOTE — ED NOTES
All discharge paperwork reviewed with patient and MD and she denies any need for further explanation regarding these instructions.   Denies need for wheelchair and ambulates out of ED

## 2018-03-08 NOTE — ED PROVIDER NOTES
EMERGENCY DEPARTMENT HISTORY AND PHYSICAL EXAM      Date: 3/7/2018  Patient Name: Anisa Mireles    History of Presenting Illness     Chief Complaint   Patient presents with    Abdominal Pain     10 weeks 5 days pregnant lower abdominal cramping sharp pain. no vaginal bleeding. whitish discharge. diarrhea once    Shortness of Breath     feeling hard to breathe; associated with palpitations       History Provided By: Patient    HPI: Anisa Mireles, 33 y.o. 10 weeks 5 days pregnant female with PMHx significant for  presents ambulatory to the ED with cc of intermittent moderate sharp lower abdominal cramping pain and palpitations today. Pt also notes having some white discharge. She denies any vaginal itching. She denies any vaginal bleeding. Pt also reports having intermittent palpitations/elevated HR when lying down during the night. She states her HR improves when propping her head up. Pt notes having similar elevated HR intermittently since age 17. She also notes feeling SOB when lying flat. Pt also notes having multiple episodes of diarrhea today. Pt states she was evaluated for similar sxs in the ER 18. She was confirmed pregnant w/ gestation age of 10 weeks and 5 days. Pt denies other modifying factors. She denies other sxs including fevers, chills, sore throat, cough, CP, N/V/D, dysuria, myalgias, HA, and rashes. Social hx: +Tobacco, -EtOH, -Drugs    PCP: None    There are no other complaints, changes, or physical findings at this time. Current Facility-Administered Medications   Medication Dose Route Frequency Provider Last Rate Last Dose    famotidine (PEPCID) tablet 20 mg  20 mg Oral BID Radha Arshad DO   20 mg at 18     Current Outpatient Prescriptions   Medication Sig Dispense Refill    acetaminophen (TYLENOL EXTRA STRENGTH) 500 mg tablet Take 2 Tabs by mouth every six (6) hours as needed for Pain or Fever.  30 Tab 0    ibuprofen (MOTRIN) 600 mg tablet Take 1 Tab by mouth every six (6) hours as needed for Pain (Fever). 20 Tab 0    fluoxetine (PROZAC) 10 mg capsule Take 10 mg by mouth daily.  lithium carbonate (LITHOBID) 600 mg capsule Take 600 mg by mouth daily.  FLUPHENAZINE HCL (PROLIXIN PO) Take  by mouth. 2 tablets at bedtime       ziprasidone (GEODON) 40 mg capsule Take 40 mg by mouth two (2) times daily (with meals).  BENZTROPINE MESYLATE (BENZTROPINE PO) Take  by mouth. One tablet in the AM and 2 at bedtime          Past History     Past Medical History:  Past Medical History:   Diagnosis Date    [facility]ism ([facility] [address]     Drug addiction ([facility] [address]     Hepatitis C     Psychiatric disorder     Psychiatric disorder        Past Surgical History:  Past Surgical History:   Procedure Laterality Date    HX ACL RECONSTRUCTION      right knee    HX TONSILLECTOMY      INNER EAR SURGERY PROC UNLISTED         Family History:  No family history on file. Social History:  Social History   Substance Use Topics    Smoking status: Current Every Day Smoker    Smokeless tobacco: Never Used    Alcohol use No      Comment: No alcohol in 4 months       Allergies: Allergies   Allergen Reactions    Tylenol [Acetaminophen] Other (comments)     Hepatitis          Review of Systems   Review of Systems   Constitutional: Negative for chills and fever. HENT: Negative for sore throat. Eyes: Negative for visual disturbance. Respiratory: Positive for shortness of breath. Negative for cough. Cardiovascular: Positive for palpitations. Negative for chest pain and leg swelling. Gastrointestinal: Positive for abdominal pain. Negative for blood in stool, diarrhea and nausea. Endocrine: Negative for polyuria. Genitourinary: Negative for dysuria, flank pain, vaginal bleeding and vaginal discharge. Musculoskeletal: Negative for myalgias. Skin: Negative for rash. Allergic/Immunologic: Negative for immunocompromised state.    Neurological: Negative for weakness and headaches. Psychiatric/Behavioral: Negative for confusion. Physical Exam   Physical Exam   Constitutional: She is oriented to person, place, and time. She appears well-developed and well-nourished. HENT:   Head: Normocephalic and atraumatic. Moist mucous membranes   Eyes: Conjunctivae are normal. Pupils are equal, round, and reactive to light. Right eye exhibits no discharge. Left eye exhibits no discharge. Neck: Normal range of motion. Neck supple. No tracheal deviation present. Cardiovascular: Regular rhythm and normal heart sounds. Tachycardia present. No murmur heard. Pulmonary/Chest: Effort normal and breath sounds normal. No respiratory distress. She has no wheezes. She has no rales. Abdominal: Soft. Bowel sounds are normal. There is no tenderness. There is no rebound and no guarding. Musculoskeletal: Normal range of motion. She exhibits no edema, tenderness or deformity. Neurological: She is alert and oriented to person, place, and time. Skin: Skin is warm and dry. No rash noted. No erythema. Psychiatric: Her behavior is normal.   Nursing note and vitals reviewed.         Diagnostic Study Results     Labs -     Recent Results (from the past 12 hour(s))   EKG, 12 LEAD, INITIAL    Collection Time: 03/07/18  7:30 PM   Result Value Ref Range    Ventricular Rate 100 BPM    Atrial Rate 100 BPM    P-R Interval 146 ms    QRS Duration 80 ms    Q-T Interval 352 ms    QTC Calculation (Bezet) 454 ms    Calculated P Axis 34 degrees    Calculated R Axis 48 degrees    Calculated T Axis 7 degrees    Diagnosis       Normal sinus rhythm  Normal ECG  No previous ECGs available     CBC WITH AUTOMATED DIFF    Collection Time: 03/07/18  8:13 PM   Result Value Ref Range    WBC 7.7 3.6 - 11.0 K/uL    RBC 4.22 3.80 - 5.20 M/uL    HGB 12.3 11.5 - 16.0 g/dL    HCT 37.9 35.0 - 47.0 %    MCV 89.8 80.0 - 99.0 FL    MCH 29.1 26.0 - 34.0 PG    MCHC 32.5 30.0 - 36.5 g/dL    RDW 13.4 11.5 - 14.5 %    PLATELET 191 150 - 400 K/uL    MPV 10.7 8.9 - 12.9 FL    NRBC 0.0 0  WBC    ABSOLUTE NRBC 0.00 0.00 - 0.01 K/uL    NEUTROPHILS 63 32 - 75 %    LYMPHOCYTES 30 12 - 49 %    MONOCYTES 6 5 - 13 %    EOSINOPHILS 2 0 - 7 %    BASOPHILS 0 0 - 1 %    IMMATURE GRANULOCYTES 0 0.0 - 0.5 %    ABS. NEUTROPHILS 4.8 1.8 - 8.0 K/UL    ABS. LYMPHOCYTES 2.3 0.8 - 3.5 K/UL    ABS. MONOCYTES 0.4 0.0 - 1.0 K/UL    ABS. EOSINOPHILS 0.1 0.0 - 0.4 K/UL    ABS. BASOPHILS 0.0 0.0 - 0.1 K/UL    ABS. IMM. GRANS. 0.0 0.00 - 0.04 K/UL    DF AUTOMATED     METABOLIC PANEL, COMPREHENSIVE    Collection Time: 03/07/18  8:13 PM   Result Value Ref Range    Sodium 139 136 - 145 mmol/L    Potassium 3.2 (L) 3.5 - 5.1 mmol/L    Chloride 104 97 - 108 mmol/L    CO2 26 21 - 32 mmol/L    Anion gap 9 5 - 15 mmol/L    Glucose 75 65 - 100 mg/dL    BUN 11 6 - 20 MG/DL    Creatinine 0.57 0.55 - 1.02 MG/DL    BUN/Creatinine ratio 19 12 - 20      GFR est AA >60 >60 ml/min/1.73m2    GFR est non-AA >60 >60 ml/min/1.73m2    Calcium 8.6 8.5 - 10.1 MG/DL    Bilirubin, total 0.2 0.2 - 1.0 MG/DL    ALT (SGPT) 84 (H) 12 - 78 U/L    AST (SGOT) 32 15 - 37 U/L    Alk.  phosphatase 81 45 - 117 U/L    Protein, total 7.4 6.4 - 8.2 g/dL    Albumin 3.4 (L) 3.5 - 5.0 g/dL    Globulin 4.0 2.0 - 4.0 g/dL    A-G Ratio 0.9 (L) 1.1 - 2.2     URINALYSIS W/ REFLEX CULTURE    Collection Time: 03/07/18  9:32 PM   Result Value Ref Range    Color YELLOW/STRAW      Appearance CLEAR CLEAR      Specific gravity 1.023 1.003 - 1.030      pH (UA) 5.5 5.0 - 8.0      Protein NEGATIVE  NEG mg/dL    Glucose NEGATIVE  NEG mg/dL    Ketone NEGATIVE  NEG mg/dL    Bilirubin NEGATIVE  NEG      Blood NEGATIVE  NEG      Urobilinogen 0.2 0.2 - 1.0 EU/dL    Nitrites NEGATIVE  NEG      Leukocyte Esterase NEGATIVE  NEG      WBC 0-4 0 - 4 /hpf    RBC 0-5 0 - 5 /hpf    Epithelial cells FEW FEW /lpf    Bacteria NEGATIVE  NEG /hpf    UA:UC IF INDICATED CULTURE NOT INDICATED BY UA RESULT CNI      Hyaline cast 0-2 0 - 5 /lpf Radiologic Studies -   No orders to display     CT Results  (Last 48 hours)    None        CXR Results  (Last 48 hours)    None            Medical Decision Making   I am the first provider for this patient. I reviewed the vital signs, available nursing notes, past medical history, past surgical history, family history and social history. Vital Signs-Reviewed the patient's vital signs. Patient Vitals for the past 12 hrs:   Temp Pulse Resp BP SpO2   03/07/18 2200 - 97 20 124/82 99 %   03/07/18 2145 - 93 20 117/86 99 %   03/07/18 2115 - 95 18 118/71 100 %   03/07/18 2100 - (!) 104 18 118/71 100 %   03/07/18 2045 - 99 20 110/73 100 %   03/07/18 2030 - 99 20 118/64 100 %   03/07/18 2015 - 100 30 122/73 100 %   03/07/18 1930 97.8 °F (36.6 °C) (!) 106 14 135/78 100 %       EKG interpretation: (Preliminary) 7:30 PM   Rhythm: normal sinus rhythm; and regular . Rate (approx.): 100 bpm; Axis: normal; NC interval: 146 ms; QRS interval: 80 ms; QT/QTc: 352/454 ms; ST/T wave: normal; Other findings: no ischemic changes. Records Reviewed: Old Medical Records    Provider Notes (Medical Decision Making):   DDx: viral syndrome, UTI, electrolyte abnormality, gastric reflux     ED Course:   Initial assessment performed. The patients presenting problems have been discussed, and they are in agreement with the care plan formulated and outlined with them. I have encouraged them to ask questions as they arise throughout their visit. Procedure Note - Bedside Ultrasound:  10:06 PM  Performed by: Lisa Jack DO  Transvaginal US performed at beside, showing IUP. HR approx. 150s. Fetal movement seen. The procedure took 1-15 minutes, and pt tolerated well. Critical Care Time:   0    Disposition:  DISCHARGE NOTE  9:59 PM   The patient has been re-evaluated and is ready for discharge. Reviewed available results with patient. Counseled pt on diagnosis and care plan.  Pt has expressed understanding, and all questions have been answered. Pt agrees with plan and agrees to F/U as recommended, or return to the ED if their sxs worsen. Discharge instructions have been provided and explained to the pt, along with reasons to return to the ED. PLAN:  1. Current Discharge Medication List        2. Follow-up Information     Follow up With Details Comments Contact Info    Your Ob/GYN Call in 1 day      [facility] EMERGENCY DEPT  If symptoms worsen [address] [place] [address]  [phone]        Return to ED if worse     Diagnosis     Clinical Impression:   1. Diarrhea, unspecified type    2. 12 weeks gestation of pregnancy        Attestations: This note is prepared by Una Fowler, acting as Scribe for Radha Arshad DO. The scribe's documentation has been prepared under my direction and personally reviewed by me in its entirety. I confirm that the note above accurately reflects all work, treatment, procedures, and medical decision making performed by me.   Radha Arshad DO

## 2018-03-19 LAB
ANTIBODY SCREEN, EXTERNAL: NORMAL
CHLAMYDIA, EXTERNAL: NORMAL
HBSAG, EXTERNAL: NORMAL
HEPATITIS C AB,   EXT: NORMAL
HIV, EXTERNAL: NON REACTIVE
N. GONORRHEA, EXTERNAL: NORMAL
RPR, EXTERNAL: NON REACTIVE
RUBELLA, EXTERNAL: NORMAL
TYPE, ABO & RH, EXTERNAL: NORMAL

## 2018-04-12 ENCOUNTER — HOSPITAL ENCOUNTER (EMERGENCY)
Age: 28
Discharge: HOME | End: 2018-04-12
Attending: EMERGENCY MEDICINE
Payer: MEDICAID

## 2018-04-12 VITALS
BODY MASS INDEX: 31.47 KG/M2 | OXYGEN SATURATION: 100 % | HEIGHT: 64 IN | SYSTOLIC BLOOD PRESSURE: 100 MMHG | DIASTOLIC BLOOD PRESSURE: 59 MMHG | RESPIRATION RATE: 18 BRPM | WEIGHT: 184.3 LBS | TEMPERATURE: 97.3 F | HEART RATE: 86 BPM

## 2018-04-12 DIAGNOSIS — R00.2 PALPITATIONS: Primary | ICD-10-CM

## 2018-04-12 LAB
ANION GAP SERPL CALC-SCNC: 7 MMOL/L (ref 5–15)
ATRIAL RATE: 97 BPM
BASOPHILS # BLD: 0 K/UL (ref 0–0.1)
BASOPHILS NFR BLD: 0 % (ref 0–1)
BUN SERPL-MCNC: 7 MG/DL (ref 6–20)
BUN/CREAT SERPL: 16 (ref 12–20)
CALCIUM SERPL-MCNC: 9 MG/DL (ref 8.5–10.1)
CALCULATED P AXIS, ECG09: 35 DEGREES
CALCULATED R AXIS, ECG10: 59 DEGREES
CALCULATED T AXIS, ECG11: 25 DEGREES
CHLORIDE SERPL-SCNC: 108 MMOL/L (ref 97–108)
CO2 SERPL-SCNC: 23 MMOL/L (ref 21–32)
CREAT SERPL-MCNC: 0.45 MG/DL (ref 0.55–1.02)
DIAGNOSIS, 93000: NORMAL
DIFFERENTIAL METHOD BLD: ABNORMAL
EOSINOPHIL # BLD: 0.2 K/UL (ref 0–0.4)
EOSINOPHIL NFR BLD: 2 % (ref 0–7)
ERYTHROCYTE [DISTWIDTH] IN BLOOD BY AUTOMATED COUNT: 13.5 % (ref 11.5–14.5)
GLUCOSE SERPL-MCNC: 97 MG/DL (ref 65–100)
HCT VFR BLD AUTO: 33.5 % (ref 35–47)
HGB BLD-MCNC: 11.5 G/DL (ref 11.5–16)
IMM GRANULOCYTES # BLD: 0 K/UL (ref 0–0.04)
IMM GRANULOCYTES NFR BLD AUTO: 1 % (ref 0–0.5)
LYMPHOCYTES # BLD: 2.9 K/UL (ref 0.8–3.5)
LYMPHOCYTES NFR BLD: 33 % (ref 12–49)
MAGNESIUM SERPL-MCNC: 2.1 MG/DL (ref 1.6–2.4)
MCH RBC QN AUTO: 30.2 PG (ref 26–34)
MCHC RBC AUTO-ENTMCNC: 34.3 G/DL (ref 30–36.5)
MCV RBC AUTO: 87.9 FL (ref 80–99)
MONOCYTES # BLD: 0.5 K/UL (ref 0–1)
MONOCYTES NFR BLD: 6 % (ref 5–13)
NEUTS SEG # BLD: 5.1 K/UL (ref 1.8–8)
NEUTS SEG NFR BLD: 59 % (ref 32–75)
NRBC # BLD: 0 K/UL (ref 0–0.01)
NRBC BLD-RTO: 0 PER 100 WBC
P-R INTERVAL, ECG05: 140 MS
PLATELET # BLD AUTO: 221 K/UL (ref 150–400)
PMV BLD AUTO: 10.6 FL (ref 8.9–12.9)
POTASSIUM SERPL-SCNC: 3.7 MMOL/L (ref 3.5–5.1)
Q-T INTERVAL, ECG07: 358 MS
QRS DURATION, ECG06: 92 MS
QTC CALCULATION (BEZET), ECG08: 454 MS
RBC # BLD AUTO: 3.81 M/UL (ref 3.8–5.2)
SODIUM SERPL-SCNC: 138 MMOL/L (ref 136–145)
TSH SERPL DL<=0.05 MIU/L-ACNC: 3.27 UIU/ML (ref 0.36–3.74)
VENTRICULAR RATE, ECG03: 97 BPM
WBC # BLD AUTO: 8.7 K/UL (ref 3.6–11)

## 2018-04-12 PROCEDURE — 93005 ELECTROCARDIOGRAM TRACING: CPT

## 2018-04-12 PROCEDURE — 85025 COMPLETE CBC W/AUTO DIFF WBC: CPT | Performed by: EMERGENCY MEDICINE

## 2018-04-12 PROCEDURE — 36415 COLL VENOUS BLD VENIPUNCTURE: CPT | Performed by: EMERGENCY MEDICINE

## 2018-04-12 PROCEDURE — 99284 EMERGENCY DEPT VISIT MOD MDM: CPT

## 2018-04-12 PROCEDURE — 84443 ASSAY THYROID STIM HORMONE: CPT | Performed by: EMERGENCY MEDICINE

## 2018-04-12 PROCEDURE — 80048 BASIC METABOLIC PNL TOTAL CA: CPT | Performed by: EMERGENCY MEDICINE

## 2018-04-12 PROCEDURE — 83735 ASSAY OF MAGNESIUM: CPT | Performed by: EMERGENCY MEDICINE

## 2018-04-12 NOTE — DISCHARGE INSTRUCTIONS
Palpitations: Care Instructions  Your Care Instructions    Heart palpitations are the uncomfortable sensation that your heart is beating fast or irregularly. You might feel pounding or fluttering in your chest. It might feel like your heart is skipping a beat. Although palpitations may be caused by a heart problem, they also occur because of stress, fatigue, or use of alcohol, caffeine, or nicotine. Many medicines, including diet pills, antihistamines, decongestants, and some herbal products, can cause heart palpitations. Nearly everyone has palpitations from time to time. Depending on your symptoms, your doctor may need to do more tests to try to find the cause of your palpitations. Follow-up care is a key part of your treatment and safety. Be sure to make and go to all appointments, and call your doctor if you are having problems. It's also a good idea to know your test results and keep a list of the medicines you take. How can you care for yourself at home? · Avoid caffeine, nicotine, and excess alcohol. · Do not take illegal drugs, such as methamphetamines and cocaine. · Do not take weight loss or diet medicines unless you talk with your doctor first.  · Get plenty of sleep. · Do not overeat. · If you have palpitations again, take deep breaths and try to relax. This may slow a racing heart. · If you start to feel lightheaded, lie down to avoid injuries that might result if you pass out and fall down. · Keep a record of your palpitations and bring it to your next doctor's appointment. Write down:  ¨ The date and time. ¨ Your pulse. (If your heart is beating fast, it may be hard to count your pulse.)  ¨ What you were doing when the palpitations started. ¨ How long the palpitations lasted. ¨ Any other symptoms. · If an activity causes palpitations, slow down or stop. Talk to your doctor before you do that activity again. · Take your medicines exactly as prescribed.  Call your doctor if you think you are having a problem with your medicine. When should you call for help? Call 911 anytime you think you may need emergency care. For example, call if:  ? · You passed out (lost consciousness). ? · You have symptoms of a heart attack. These may include:  ¨ Chest pain or pressure, or a strange feeling in the chest.  ¨ Sweating. ¨ Shortness of breath. ¨ Pain, pressure, or a strange feeling in the back, neck, jaw, or upper belly or in one or both shoulders or arms. ¨ Lightheadedness or sudden weakness. ¨ A fast or irregular heartbeat. After you call 911, the  may tell you to chew 1 adult-strength or 2 to 4 low-dose aspirin. Wait for an ambulance. Do not try to drive yourself. ? · You have symptoms of a stroke. These may include:  ¨ Sudden numbness, tingling, weakness, or loss of movement in your face, arm, or leg, especially on only one side of your body. ¨ Sudden vision changes. ¨ Sudden trouble speaking. ¨ Sudden confusion or trouble understanding simple statements. ¨ Sudden problems with walking or balance. ¨ A sudden, severe headache that is different from past headaches. ?Call your doctor now or seek immediate medical care if:  ? · You have heart palpitations and:  ¨ Are dizzy or lightheaded, or you feel like you may faint. ¨ Have new or increased shortness of breath. ? Watch closely for changes in your health, and be sure to contact your doctor if:  ? · You continue to have heart palpitations. Where can you learn more? Go to http://queta-maxine.info/. Enter R508 in the search box to learn more about \"Palpitations: Care Instructions. \"  Current as of: September 21, 2016  Content Version: 11.4  © 0473-4400 [employer]. Care instructions adapted under license by [employer] (which disclaims liability or warranty for this information).  If you have questions about a medical condition or this instruction, always ask your healthcare professional. [address] any warranty or liability for your use of this information.

## 2018-04-12 NOTE — ED PROVIDER NOTES
EMERGENCY DEPARTMENT HISTORY AND PHYSICAL EXAM      Date: 4/12/2018  Patient Name: Maria Elena Alfaro    History of Presenting Illness     Chief Complaint   Patient presents with    Rapid Heart Rate     Patient ws woken up out of her sleep with numbness of her left hand and palpitations. She has had palpitations but they have gotten worse with her pregnancy. Patient is 16 weeks pregnant. She has an appt. with a cardiologist on 4/23. History Provided By: Patient    HPI: Maria Elena Alfaro, 33 y.o. female currently 16 weeks pregnant with PMHx significant for alcoholism / drug addiction / hepatitis C, presents ambulatory accompanied by family member to the ED with cc of sudden onset palpitations that woke her from her sleep shortly PTA. Pt complains of mild central CP that began a few hours ago but gradually improved and has resolved since arrival to the ED. She also complains of transient dizziness associated with her palpitations and mild R hand numbness that began yesterday but has since resolved. Pt notes that her palpitations persisted for several minutes before gradually improving although she notes that her palpitations have not quite resolved entirely. She describes her palpitations as a sensation of a fast and hard heart beat but notes that she does not feel as though her heart is skipping a beat. Pt reports a long history of similar recurrent episodes of palpitations. However, she notes that her previous episodes typically resolve spontaneously and states that she has not been hospitalized for such an episode in several years. She states that she has been seen by a cardiologist in the past for these episodes and has worn a halter monitor but denies any significant findings. Pt denies any personal history of MI but notes that her mother had an MI at the age of 36. She denies any increase in stress during her current pregnancy.  Pt endorses taking regular prenatal vitamins and maintaining good hydration/nutrition. She notes that she has an upcoming appointment with a cardiologist on 4/23/2018. Pt specifically denies nausea, vomiting, fever, or chills. PCP: None    There are no other complaints, changes, or physical findings at this time. Current Outpatient Prescriptions   Medication Sig Dispense Refill    acetaminophen (TYLENOL EXTRA STRENGTH) 500 mg tablet Take 2 Tabs by mouth every six (6) hours as needed for Pain or Fever. 30 Tab 0    ibuprofen (MOTRIN) 600 mg tablet Take 1 Tab by mouth every six (6) hours as needed for Pain (Fever). 20 Tab 0    fluoxetine (PROZAC) 10 mg capsule Take 10 mg by mouth daily.  lithium carbonate (LITHOBID) 600 mg capsule Take 600 mg by mouth daily.  FLUPHENAZINE HCL (PROLIXIN PO) Take  by mouth. 2 tablets at bedtime       ziprasidone (GEODON) 40 mg capsule Take 40 mg by mouth two (2) times daily (with meals).  BENZTROPINE MESYLATE (BENZTROPINE PO) Take  by mouth. One tablet in the AM and 2 at bedtime          Past History     Past Medical History:  Past Medical History:   Diagnosis Date    [facility]ism ([facility]     Drug addiction ([facility]     Hepatitis C     Psychiatric disorder     Psychiatric disorder        Past Surgical History:  Past Surgical History:   Procedure Laterality Date    HX ACL RECONSTRUCTION      right knee    HX TONSILLECTOMY      INNER EAR SURGERY PROC UNLISTED         Family History:  No family history on file. Social History:  Social History   Substance Use Topics    Smoking status: Current Every Day Smoker    Smokeless tobacco: Never Used    Alcohol use No      Comment: No alcohol in 4 months       Allergies: Allergies   Allergen Reactions    Tylenol [Acetaminophen] Other (comments)     Hepatitis          Review of Systems   Review of Systems   Constitutional: Negative for activity change, appetite change, chills, fever and unexpected weight change. HENT: Negative for congestion.     Eyes: Negative for pain and visual disturbance. Respiratory: Negative for cough and shortness of breath. Cardiovascular: Positive for chest pain and palpitations. Gastrointestinal: Negative for abdominal pain, diarrhea, nausea and vomiting. Genitourinary: Negative for dysuria. Musculoskeletal: Negative for back pain. Skin: Negative for rash. Neurological: Positive for dizziness and numbness (R hand, resolved). Negative for headaches. Physical Exam   Physical Exam   Constitutional: She is oriented to person, place, and time. She appears well-developed and well-nourished. Well-appearing pregnant female  Appears anxious but is otherwise in minimal distress   HENT:   Head: Normocephalic and atraumatic. Mouth/Throat: Oropharynx is clear and moist.   Eyes: Conjunctivae and EOM are normal. Pupils are equal, round, and reactive to light. Right eye exhibits no discharge. Left eye exhibits no discharge. Neck: Normal range of motion. Neck supple. Cardiovascular: Normal rate, regular rhythm and normal heart sounds. No murmur heard. Heart rate 96 BPM   Pulmonary/Chest: Effort normal and breath sounds normal. No respiratory distress. She has no wheezes. She has no rales. Abdominal: Soft. Bowel sounds are normal. She exhibits no distension. There is no tenderness. Musculoskeletal: Normal range of motion. She exhibits no edema. Neurological: She is alert and oriented to person, place, and time. No cranial nerve deficit. She exhibits normal muscle tone. Skin: Skin is warm and dry. No rash noted. She is not diaphoretic. Multiple tattoos   Nursing note and vitals reviewed.         Diagnostic Study Results     Labs -     Recent Results (from the past 12 hour(s))   EKG, 12 LEAD, INITIAL    Collection Time: 04/12/18  1:03 AM   Result Value Ref Range    Ventricular Rate 97 BPM    Atrial Rate 97 BPM    P-R Interval 140 ms    QRS Duration 92 ms    Q-T Interval 358 ms    QTC Calculation (Bezet) 454 ms    Calculated P Axis 35 degrees Calculated R Axis 59 degrees    Calculated T Axis 25 degrees    Diagnosis       Normal sinus rhythm  Normal ECG  When compared with ECG of 07-MAR-2018 19:30,  No significant change was found     MAGNESIUM    Collection Time: 04/12/18  2:08 AM   Result Value Ref Range    Magnesium 2.1 1.6 - 2.4 mg/dL   CBC WITH AUTOMATED DIFF    Collection Time: 04/12/18  2:08 AM   Result Value Ref Range    WBC 8.7 3.6 - 11.0 K/uL    RBC 3.81 3.80 - 5.20 M/uL    HGB 11.5 11.5 - 16.0 g/dL    HCT 33.5 (L) 35.0 - 47.0 %    MCV 87.9 80.0 - 99.0 FL    MCH 30.2 26.0 - 34.0 PG    MCHC 34.3 30.0 - 36.5 g/dL    RDW 13.5 11.5 - 14.5 %    PLATELET 874 755 - 844 K/uL    MPV 10.6 8.9 - 12.9 FL    NRBC 0.0 0  WBC    ABSOLUTE NRBC 0.00 0.00 - 0.01 K/uL    NEUTROPHILS 59 32 - 75 %    LYMPHOCYTES 33 12 - 49 %    MONOCYTES 6 5 - 13 %    EOSINOPHILS 2 0 - 7 %    BASOPHILS 0 0 - 1 %    IMMATURE GRANULOCYTES 1 (H) 0.0 - 0.5 %    ABS. NEUTROPHILS 5.1 1.8 - 8.0 K/UL    ABS. LYMPHOCYTES 2.9 0.8 - 3.5 K/UL    ABS. MONOCYTES 0.5 0.0 - 1.0 K/UL    ABS. EOSINOPHILS 0.2 0.0 - 0.4 K/UL    ABS. BASOPHILS 0.0 0.0 - 0.1 K/UL    ABS. IMM.  GRANS. 0.0 0.00 - 0.04 K/UL    DF AUTOMATED     METABOLIC PANEL, BASIC    Collection Time: 04/12/18  2:08 AM   Result Value Ref Range    Sodium 138 136 - 145 mmol/L    Potassium 3.7 3.5 - 5.1 mmol/L    Chloride 108 97 - 108 mmol/L    CO2 23 21 - 32 mmol/L    Anion gap 7 5 - 15 mmol/L    Glucose 97 65 - 100 mg/dL    BUN 7 6 - 20 MG/DL    Creatinine 0.45 (L) 0.55 - 1.02 MG/DL    BUN/Creatinine ratio 16 12 - 20      GFR est AA >60 >60 ml/min/1.73m2    GFR est non-AA >60 >60 ml/min/1.73m2    Calcium 9.0 8.5 - 10.1 MG/DL   TSH 3RD GENERATION    Collection Time: 04/12/18  2:08 AM   Result Value Ref Range    TSH 3.27 0.36 - 3.74 uIU/mL       Radiologic Studies -   No orders to display     CT Results  (Last 48 hours)    None        CXR Results  (Last 48 hours)    None            Medical Decision Making   I am the first provider for this patient. I reviewed the vital signs, available nursing notes, past medical history, past surgical history, family history and social history. Vital Signs-Reviewed the patient's vital signs. Patient Vitals for the past 12 hrs:   Temp Pulse Resp BP SpO2   04/12/18 0300 - 86 18 100/59 100 %   04/12/18 0245 - 93 15 92/41 100 %   04/12/18 0230 - 89 15 100/54 100 %   04/12/18 0215 - 97 15 102/58 99 %   04/12/18 0200 - 94 19 109/61 98 %   04/12/18 0152 - 98 14 - 98 %   04/12/18 0151 - 91 - 103/63 -   04/12/18 0100 97.3 °F (36.3 °C) (!) 114 16 129/72 100 %       Pulse Oximetry Analysis - 100% on RA    Cardiac Monitor:   Rate: 96 bpm  Rhythm: Normal Sinus Rhythm      EKG interpretation: (Preliminary)  01:03  Rhythm: normal sinus rhythm; and regular . Rate (approx.): 97; Axis: normal; VT interval: normal; QRS interval: normal ; ST/T wave: normal; Other findings: normal.  Written by Brooke Hernadez ED Scribjessica, as dictated by William Wilson MD.    Records Reviewed: Nursing Notes and Old Medical Records    Provider Notes (Medical Decision Making):   Well-appearing pregnant female presenting with recurrent palpitations, currently in NSR without any evidence of ectopy. ED Course:   Initial assessment performed. The patients presenting problems have been discussed, and they are in agreement with the care plan formulated and outlined with them. I have encouraged them to ask questions as they arise throughout their visit. 03:00AM Patient continues to do well without further episodes. Discussed results as well as home care and cardiology follow-up as previously scheduled and return precautions. Critical Care Time:   0    Disposition:  DISCHARGE NOTE  03:00 AM  The patient has been re-evaluated and is ready for discharge. Reviewed available results with patient. Counseled pt on diagnosis and care plan. Pt has expressed understanding, and all questions have been answered.  Pt agrees with plan and agrees to F/U as recommended, or return to the ED if their sxs worsen. Discharge instructions have been provided and explained to the pt, along with reasons to return to the ED. PLAN:  1. Discharge Medication List as of 4/12/2018  2:55 AM        2. Follow-up Information     Follow up With Details Comments Contact Info    [facility] EMERGENCY DEPT  If symptoms worsen [address] [address] [facility] [address] [address]  [phone]        Return to ED if worse     Diagnosis     Clinical Impression:   1. Palpitations        Attestations: This note is prepared by Elicia Adan, acting as Scribe for Mino Caballero MD.    The scribe's documentation has been prepared under my direction and personally reviewed by me in its entirety. I confirm that the note above accurately reflects all work, treatment, procedures, and medical decision making performed by me.   Mino Caballero MD

## 2018-04-12 NOTE — ED NOTES
I have reviewed the chart and documentation for Anita Schuster RN and I agree with the documentation as recorded

## 2018-04-12 NOTE — LETTER
[address] 
[facility] EMERGENCY DEPT 
[address] [place] [address] [address] [address] [phone] Work/School Note Date: 4/12/2018 To Whom It May concern: 
 
Saint Crews was seen and treated today in the emergency room by the following provider(s): 
Attending Provider: Ailyn Lopez. MD Analisa. Saint Crews may return to work on 4/13/18. Sincerely, 
 
 
 
 
Ailyn Hauser MD

## 2018-04-23 ENCOUNTER — CLINICAL SUPPORT (OUTPATIENT)
Dept: CARDIOLOGY CLINIC | Age: 28
End: 2018-04-23

## 2018-04-23 ENCOUNTER — OFFICE VISIT (OUTPATIENT)
Dept: CARDIOLOGY CLINIC | Age: 28
End: 2018-04-23

## 2018-04-23 VITALS
OXYGEN SATURATION: 96 % | RESPIRATION RATE: 20 BRPM | HEART RATE: 101 BPM | WEIGHT: 183.5 LBS | SYSTOLIC BLOOD PRESSURE: 104 MMHG | BODY MASS INDEX: 30.57 KG/M2 | DIASTOLIC BLOOD PRESSURE: 52 MMHG | HEIGHT: 65 IN

## 2018-04-23 DIAGNOSIS — R00.2 HEART PALPITATIONS: ICD-10-CM

## 2018-04-23 DIAGNOSIS — R00.2 HEART PALPITATIONS: Primary | ICD-10-CM

## 2018-04-23 DIAGNOSIS — R06.02 SOB (SHORTNESS OF BREATH): ICD-10-CM

## 2018-04-23 NOTE — PROGRESS NOTES
Ashia Veloz, [address] [facility]  [phone]     Subjective:      Paxton Farmer is a 33 y.o. female currently 16 weeks pregnant with PMHx significant for alcoholism / drug addiction / hepatitis C, presents here today for further evaluation of palpitations. She initially presented to the ED 4/12//18 with cc of sudden onset palpitations that woke her from her sleep,  mild central chest tightness that gradually improved and resolved and mild SOB. She also complains of transient dizziness associated with her palpitations and mild R hand numbness. In the ED, EKG showed NSR. Normal TSH. Denies syncope/presyncope. Currently smokes 0.5 PPD. Denies alcohol/drug use/excessive caffeine or stimulant intake. Family hx: maternal: enlarged heart, passed away from MI in her 45s; paternal: n/a; siblings: n/a       Patient Active Problem List    Diagnosis Date Noted    Heart palpitations 04/23/2018      None  Past Medical History:   Diagnosis Date    [facility]     Drug addiction ([facility]     Hepatitis C     Psychiatric disorder     Psychiatric disorder       Past Surgical History:   Procedure Laterality Date    HX ACL RECONSTRUCTION      right knee    HX TONSILLECTOMY      INNER EAR SURGERY PROC UNLISTED       Allergies   Allergen Reactions    Tylenol [Acetaminophen] Other (comments)     Hepatitis       History reviewed. No pertinent family history. Social History     Social History    Marital status: SINGLE     Spouse name: N/A    Number of children: N/A    Years of education: N/A     Occupational History    Not on file.      Social History Main Topics    Smoking status: Current Every Day Smoker    Smokeless tobacco: Never Used    Alcohol use No      Comment: No alcohol in 4 months    Drug use: No      Comment: \"I haven't used adderal for 17 months\", heroin , cocaine    Sexual activity: Not on file     Other Topics Concern    Not on file     Social History Narrative      Current Outpatient Prescriptions   Medication Sig    [id number] fum-folic ac-om3 (ONE A DAY WOMEN'S PRENATAL DHA) 28 mg iron- 800 mcg cmpk Take 1 Tab by mouth daily.  acetaminophen (TYLENOL EXTRA STRENGTH) 500 mg tablet Take 2 Tabs by mouth every six (6) hours as needed for Pain or Fever.  ibuprofen (MOTRIN) 600 mg tablet Take 1 Tab by mouth every six (6) hours as needed for Pain (Fever).  fluoxetine (PROZAC) 10 mg capsule Take 10 mg by mouth daily.  lithium carbonate (LITHOBID) 600 mg capsule Take 600 mg by mouth daily.  FLUPHENAZINE HCL (PROLIXIN PO) Take  by mouth. 2 tablets at bedtime     ziprasidone (GEODON) 40 mg capsule Take 40 mg by mouth two (2) times daily (with meals).  BENZTROPINE MESYLATE (BENZTROPINE PO) Take  by mouth. One tablet in the AM and 2 at bedtime      No current facility-administered medications for this visit. Review of Symptoms:  11 systems reviewed, negative other than as stated in the HPI    Physical ExamPhysical Exam:    Vitals:    04/23/18 1416 04/23/18 1426   BP: 100/50 104/52   Pulse: (!) 101    Resp: 20    SpO2: 96%    Weight: 183 lb 8 oz (83.2 kg)    Height: 5' 5\" (1.651 m)      Body mass index is 30.54 kg/(m^2). General PE   Gen:  NAD  Mental Status - Alert. General Appearance - Not in acute distress. Chest and Lung Exam   Inspection: Accessory muscles - No use of accessory muscles in breathing. Auscultation:   Breath sounds: - Normal.   Cardiovascular   Inspection: Jugular vein - Bilateral - Inspection Normal.   Palpation/Percussion:   Apical Impulse: - Normal.   Auscultation: Rhythm - Regular. Heart Sounds - S1 WNL and S2 WNL. No S3 or S4. Murmurs & Other Heart Sounds: Auscultation of the heart reveals - No Murmurs. Peripheral Vascular   Upper Extremity: Inspection - Bilateral - No Cyanotic nailbeds or Digital clubbing. Lower Extremity:   Palpation: Edema - Bilateral - No edema.   Abdomen:   Soft, non-tender, bowel sounds are active. Neuro: A&O times 3, CN and motor grossly WNL    Labs:   No results found for: CHOL, CHOLX, CHLST, CHOLV, 890127, HDL, LDL, LDLC, DLDLP, TGLX, TRIGL, TRIGP, CHHD, CHHDX  No results found for: CPK, CPKX, CPX  Lab Results   Component Value Date/Time    Sodium 138 04/12/2018 02:08 AM    Potassium 3.7 04/12/2018 02:08 AM    Chloride 108 04/12/2018 02:08 AM    CO2 23 04/12/2018 02:08 AM    Anion gap 7 04/12/2018 02:08 AM    Glucose 97 04/12/2018 02:08 AM    BUN 7 04/12/2018 02:08 AM    Creatinine 0.45 (L) 04/12/2018 02:08 AM    BUN/Creatinine ratio 16 04/12/2018 02:08 AM    GFR est AA >60 04/12/2018 02:08 AM    GFR est non-AA >60 04/12/2018 02:08 AM    Calcium 9.0 04/12/2018 02:08 AM    Bilirubin, total 0.2 03/07/2018 08:13 PM    AST (SGOT) 32 03/07/2018 08:13 PM    Alk. phosphatase 81 03/07/2018 08:13 PM    Protein, total 7.4 03/07/2018 08:13 PM    Albumin 3.4 (L) 03/07/2018 08:13 PM    Globulin 4.0 03/07/2018 08:13 PM    A-G Ratio 0.9 (L) 03/07/2018 08:13 PM    ALT (SGPT) 84 (H) 03/07/2018 08:13 PM       EKG:  SR     Assessment:     Assessment:      1. Heart palpitations    2. SOB (shortness of breath)        Orders Placed This Encounter    AMB POC EKG ROUTINE W/ 12 LEADS, INTER & REP     Order Specific Question:   Reason for Exam:     Answer:   ROUTINE    HOLTER MONITOR, 24 HOURS, Clinic Performed     Standing Status:   Future     Standing Expiration Date:   10/23/2018     Order Specific Question:   Reason for Exam:     Answer:   palpitation    2D ECHO COMPLETE ADULT (TTE) W OR WO CONTR     Order Specific Question:   Reason for Exam:     Answer:   palpitation     Order Specific Question:   Is Patient Pregnant? Answer:   Yes     Order Specific Question:   Contrast Enhancement (Bubble Study, Definity, Optison) may be used if criteria listed in established evidence-based protocol has been identified. Answer:    Yes    [id number] fum-folic ac-om3 (ONE A DAY WOMEN'S PRENATAL DHA) 28 mg iron- 800 mcg cmpk     Sig: Take 1 Tab by mouth daily. Plan: This is a 32 YO female who is currently 16 weeks pregnant with PMHx significant for alcoholism / drug addiction / hepatitis C, presents here today for further evaluation of palpitations. Also reports chest tightness when bending over, transient dizziness, sob and isolated episode of L hand numbness which prompter her to go to the ED 4/12/18. Today, BP normotensive. EKG showed NSR. Will obtain baseline echocardiogram to evaluate for structural heart disease and holter monitor. Further care depending on test results.         Wilbur Blizzard, MD

## 2018-04-23 NOTE — PROGRESS NOTES
1. Have you been to the ER, urgent care clinic since your last visit? Hospitalized since your last visit? YES, 2 WEEKS AGO. 2. Have you seen or consulted any other health care providers outside of the [facility] since your last visit? Include any pap smears or colon screening. YES, DR. Angelia Rico. NEW PATIENT. C/O HEART PALPITATIONS, OFF AND ON, MORE FREQUENT, CHEST PAIN OCCASIONALLY, SOB, SWELLING IN BLE.

## 2018-04-23 NOTE — MR AVS SNAPSHOT
Vincent Olivares [address]ée [address] [facility] 
[phone] Patient: Kalyani Brown MRN: [id number] :1990 Visit Information Date & Time Provider Department Dept. Phone Encounter #  
 2018  1:30 PM Serge Ordonez, [address] [place] [address] [facility] [phone] [id number] Your Appointments 2018  3:30 PM  
PROCEDURE with Roxy Huffman [facility] Appt Note: holter/ tachy [address] [facility]  
[phone] [address] [facility]  
  
    
 2018 12:30 PM  
ECHO CARDIOGRAMS 2D with [address] [facility] Appt Note: DR LEWIS 2D ECHO COMPLETE ADULT (TTE) W OR WO CONTR Maite Nest (Order [id number]) [address] [facility]  
[phone] [address] [facility] Upcoming Health Maintenance Date Due  
 HPV Age 9Y-34Y (1 of 3 - Female 3 Dose Series) 2002 Pneumococcal 19-64 Medium Risk (1 of 1 - PPSV23) 2010 DTaP/Tdap/Td series (1 - Tdap) 2012 PAP AKA CERVICAL CYTOLOGY 2012 Influenza Age 5 to Adult 2017 Allergies as of 2018  Review Complete On: 2018 By: Serge Ordonez MD  
  
 Severity Noted Reaction Type Reactions Tylenol [Acetaminophen]  10/10/2014    Other (comments) Hepatitis Current Immunizations  Never Reviewed No immunizations on file. Not reviewed this visit You Were Diagnosed With   
  
 Codes Comments Heart palpitations    -  Primary ICD-10-CM: R00.2 ICD-9-CM: 785.1 SOB (shortness of breath)     ICD-10-CM: R06.02 
ICD-9-CM: 786.05 Vitals  BP Pulse Resp Height(growth percentile) Weight(growth percentile) LMP  
 104/52 (BP 1 Location: Right arm, BP Patient Position: Sitting) (!) 101 20 5' 5\" (1.651 m) 183 lb 8 oz (83.2 kg) 12/27/2017 SpO2 BMI OB Status Smoking Status 96% 30.54 kg/m2 Pregnant Current Every Day Smoker Vitals History BMI and BSA Data Body Mass Index Body Surface Area 30.54 kg/m 2 1.95 m 2 Your Updated Medication List  
  
   
This list is accurate as of 4/23/18  3:27 PM.  Always use your most recent med list.  
  
  
  
  
 acetaminophen 500 mg tablet Commonly known as:  [address] [employer] [address]ha Se Take 2 Tabs by mouth every six (6) hours as needed for Pain or Fever. BENZTROPINE PO Take  by mouth. One tablet in the AM and 2 at bedtime GEODON 40 mg capsule Generic drug:  ziprasidone Take 40 mg by mouth two (2) times daily (with meals). ibuprofen 600 mg tablet Commonly known as:  MOTRIN Take 1 Tab by mouth every six (6) hours as needed for Pain (Fever). lithium carbonate 600 mg capsule Take 600 mg by mouth daily. ONE A DAY WOMEN'S PRENATAL DHA 28 mg iron- 800 mcg Cmpk Generic drug:  [id number] fum-folic ac-om3 Take 1 Tab by mouth daily. PROLIXIN PO Take  by mouth. 2 tablets at bedtime PROzac 10 mg capsule Generic drug:  FLUoxetine Take 10 mg by mouth daily. We Performed the Following 2D ECHO COMPLETE ADULT (TTE) W OR WO CONTR [04994 CPT(R)] AMB POC EKG ROUTINE W/ 12 LEADS, INTER & REP [97391 CPT(R)] To-Do List   
 04/23/2018 ECG:  CARDIAC HOLTER MONITOR, 24 HOURS Introducing \[facility] [place] [facility]\"" & HEALTH SERVICES! [facility] introduces [employer] patient portal. Now you can access parts of your medical record, email your doctor's office, and request medication refills online. 1. In your internet browser, go to [email or web address] 2. Click on the First Time User? Click Here link in the Sign In box. You will see the New Member Sign Up page. 3. Enter your [employer] Access Code exactly as it appears below.  You will not need to use this code after youve completed the sign-up process. If you do not sign up before the expiration date, you must request a new code. · [employer] Access Code: [email or web address] Expires: 4/30/2018  4:18 PM 
 
4. Enter the last four digits of your Social Security Number (xxxx) and Date of Birth (mm/dd/yyyy) as indicated and click Submit. You will be taken to the next sign-up page. 5. Create a [employer] ID. This will be your [employer] login ID and cannot be changed, so think of one that is secure and easy to remember. 6. Create a [employer] password. You can change your password at any time. 7. Enter your Password Reset Question and Answer. This can be used at a later time if you forget your password. 8. Enter your e-mail address. You will receive e-mail notification when new information is available in [address] 9. Click Sign Up. You can now view and download portions of your medical record. 10. Click the Download Summary menu link to download a portable copy of your medical information. If you have questions, please visit the Frequently Asked Questions section of the [employer] website. Remember, [employer] is NOT to be used for urgent needs. For medical emergencies, dial 911. Now available from your iPhone and Android! Please provide this summary of care documentation to your next provider. Your primary care clinician is listed as NONE. If you have any questions after today's visit, please call [phone].

## 2018-04-25 ENCOUNTER — CLINICAL SUPPORT (OUTPATIENT)
Dept: CARDIOLOGY CLINIC | Age: 28
End: 2018-04-25

## 2018-04-25 DIAGNOSIS — R00.2 HEART PALPITATIONS: Primary | ICD-10-CM

## 2018-04-30 ENCOUNTER — TELEPHONE (OUTPATIENT)
Dept: CARDIOLOGY CLINIC | Age: 28
End: 2018-04-30

## 2018-04-30 NOTE — TELEPHONE ENCOUNTER
Pt called. Verified patient with two patient identifiers. Per Dr Joshua Broussard, advised echo damir, HM benign, nothing of any concern. States she is still having symptoms and would like to schedule appt to discuss what else can be done. She is calling to schedule the appt. Patient verbalized understanding.

## 2018-05-07 NOTE — PROGRESS NOTES
Spoke to patient using 2 identifiers. Per Dr. Neomia Bence, pt was made aware that there were rare extra beats which is nothing to be concerned about. Pt stated palpitations are bothersome and would like to know when to return now that testing has been completed.

## 2018-05-29 ENCOUNTER — HOSPITAL ENCOUNTER (EMERGENCY)
Age: 28
Discharge: HOME | End: 2018-05-29
Attending: EMERGENCY MEDICINE
Payer: MEDICAID

## 2018-05-29 VITALS
TEMPERATURE: 98.3 F | WEIGHT: 189.6 LBS | DIASTOLIC BLOOD PRESSURE: 88 MMHG | HEIGHT: 64 IN | HEART RATE: 90 BPM | BODY MASS INDEX: 32.37 KG/M2 | SYSTOLIC BLOOD PRESSURE: 142 MMHG | RESPIRATION RATE: 16 BRPM | OXYGEN SATURATION: 100 %

## 2018-05-29 DIAGNOSIS — Z23 RABIES, NEED FOR PROPHYLACTIC VACCINATION AGAINST: Primary | ICD-10-CM

## 2018-05-29 PROCEDURE — 74011250636 HC RX REV CODE- 250/636: Performed by: PHYSICIAN ASSISTANT

## 2018-05-29 PROCEDURE — 96372 THER/PROPH/DIAG INJ SC/IM: CPT

## 2018-05-29 PROCEDURE — 90471 IMMUNIZATION ADMIN: CPT

## 2018-05-29 PROCEDURE — 99281 EMR DPT VST MAYX REQ PHY/QHP: CPT

## 2018-05-29 PROCEDURE — 90675 RABIES VACCINE IM: CPT | Performed by: PHYSICIAN ASSISTANT

## 2018-05-29 PROCEDURE — 90375 RABIES IG IM/SC: CPT | Performed by: PHYSICIAN ASSISTANT

## 2018-05-29 RX ADMIN — RABIES VACCINE 2.5 UNITS: KIT at 15:09

## 2018-05-29 RX ADMIN — RABIES IMMUNE GLOBULIN (HUMAN) 1725 UNITS: 150 INJECTION INTRAMUSCULAR at 14:57

## 2018-05-29 NOTE — DISCHARGE INSTRUCTIONS
Preventing Rabies Infection: Care Instructions  Your Care Instructions    Rabies is a disease caused by a virus that can affect the brain and nervous system. You can get rabies when you are exposed to an animal that has rabies. This can happen through a bite, scratch, or other contact. Your doctor can use two medicines to help your body fight the virus before it causes an infection. One is rabies immunoglobulin. It works by giving your body a type of protein called an antibody to stop the rabies virus. The other medicine is the rabies vaccine. It helps your body produce its own protection against the rabies virus. When you get these shots before serious symptoms appear, you should not get infected with rabies. Your doctor will give you a shot schedule. Make sure that you do not miss any doses. You need to get all the doses for the rabies vaccine to work. If you are exposed and have not been vaccinated against rabies, you should get 4 doses of rabies vaccine. · Get 1 dose right away. You should also get a rabies immunoglobulin shot when you get the first dose. · Get more doses on the 3rd, 7th, and 14th days. If you're exposed and have been vaccinated before, you should get 2 doses of rabies vaccine. · Get 1 dose right away. In this case, you do not need rabies immunoglobulin. · Get another on the 3rd day. You might also get a shot to prevent rabies if you handle animals often. Or you may get one if you plan to travel to places where rabies is a risk. Follow-up care is a key part of your treatment and safety. Be sure to make and go to all appointments, and call your doctor if you are having problems. It's also a good idea to know your test results and keep a list of the medicines you take. How can you care for yourself at home? · Do not drive or use machines if the rabies vaccine makes you dizzy. · Both types of rabies shots may cause fever.  And both may cause pain or stiffness where you got the shot. If your doctor recommends it, take an over-the-counter pain medicine, such as acetaminophen (Tylenol), ibuprofen (Advil, Motrin), or naproxen (Aleve), as needed. Read and follow all instructions on the label. · Do not take two or more pain medicines at the same time unless the doctor told you to. Many pain medicines have acetaminophen, which is Tylenol. Too much acetaminophen (Tylenol) can be harmful. To prevent contact with rabies  · Make sure your dog, cat, or ferret gets the rabies vaccine. · Avoid all contact with bats. · Never touch or try to pet or catch wild animals. This includes raccoons, skunks, foxes, and coyotes. · Secure trash and other items that attract animals. · Secure open areas of your home. Close off pet doors, chimneys, unscreened windows, or any place that wild or stray animals could get in. · Never handle a dead or wounded animal.  To take care of an animal bite  · Wash any animal bite or area of exposure right away. Use soap and water. · Call your doctor to find out how to care for your wound. · If the animal is a dog, cat, or pet ferret, try to find and contact the owner. If you can't find the owner, call the local animal control to safely catch the animal.  · If the animal is wild, do not try to catch or kill it. Find out what type of animal it is. Note whether it is acting normal. Report it to the local animal control. · Contact the local or state health department to report a bite or a severe scratch from an animal.  · Ask your doctor if you need a tetanus shot. When should you call for help? Watch closely for changes in your health, and be sure to contact your doctor if you have any problems. Where can you learn more? Go to http://queta-maxine.info/. Enter U105 in the search box to learn more about \"Preventing Rabies Infection: Care Instructions. \"  Current as of: March 3, 2017  Content Version: 11.4  © 9023-7377 [employer].  Care instructions adapted under license by [employer] (which disclaims liability or warranty for this information). If you have questions about a medical condition or this instruction, always ask your healthcare professional. [address] any warranty or liability for your use of this information.

## 2018-05-29 NOTE — ED PROVIDER NOTES
EMERGENCY DEPARTMENT HISTORY AND PHYSICAL EXAM      PROVIDER IN TRIAGE NOTE:  1:25 PM  [facility] has evaluated the patient as the Provider in Triage (PIT) for a precautionary rabies vaccination. The pt and her 11 month old infant has not received any of the vaccinations in the rabies series. The patient states that she saw a bat flying around her bedroom 2 days ago. She states that her infant daughter was in the room, and she left her daughter unsupervised for some time. She states that she was able to get the bat out of the house, and Animal Control visited the home this morning. Pt is currently 23-24 weeks pregnant. The vital signs and the triage nurse assessment have been reviewed. The patient and any available family have been advised that the appropriate studies have been ordered to initiate the work up based on the clinical presentation during the assessment. The pt has been advised that they will be accommodated as soon as possible. The pt has been requested to contact the triage nurse or PIT immediately if they experiences any changes in their condition during this brief waiting period. Written by KIKO Lozoya, as dictated by Bhargav Felix.     Progress Note:  1:25 PM  Spoke with Pharmacy regarding the rabies vaccinations in pregnant women. Pharmacy reports that rabies vaccination is safe in pregnancy. Written by KIKO Lozoya, as dictated by Bhargav Felix.    Date: 2018  Patient Name: Mk Acuña    History of Presenting Illness     Chief Complaint   Patient presents with    Immunization/Injection     Ambulatory w/ c/o need for rabies vaccine after having bat flying around bedroom yest morning. Mother is 20 weeks pregnant. History Provided By: Patient    HPI: Mk Acuña, 33 y.o. female (/A0) with no significant past medical history, presents ambulatory and 23-24 weeks pregnant to the ED for a prophylactic rabies vaccination.  The patient states that she had a bat flying around her bedroom 2 days ago. She denies being bitten by the bat, but she requests a rabies vaccination today. She states that she was able to get rid of the bad, and she reports Animal Control visiting her home this morning, prior to arrival.    Given that the pt is currently asymptomatic, there is no applicable location, quality, duration, severity, timing, context, associated sxs, additional context, or modifying factors. There are no other complaints, changes, or physical findings at this time. PCP: None    Current Outpatient Prescriptions   Medication Sig Dispense Refill    [id number] fum-folic ac-om3 (ONE A DAY WOMEN'S PRENATAL DHA) 28 mg iron- 800 mcg cmpk Take 1 Tab by mouth daily.  acetaminophen (TYLENOL EXTRA STRENGTH) 500 mg tablet Take 2 Tabs by mouth every six (6) hours as needed for Pain or Fever. 30 Tab 0    ibuprofen (MOTRIN) 600 mg tablet Take 1 Tab by mouth every six (6) hours as needed for Pain (Fever). 20 Tab 0    fluoxetine (PROZAC) 10 mg capsule Take 10 mg by mouth daily.  lithium carbonate (LITHOBID) 600 mg capsule Take 600 mg by mouth daily.  FLUPHENAZINE HCL (PROLIXIN PO) Take  by mouth. 2 tablets at bedtime       ziprasidone (GEODON) 40 mg capsule Take 40 mg by mouth two (2) times daily (with meals).  BENZTROPINE MESYLATE (BENZTROPINE PO) Take  by mouth. One tablet in the AM and 2 at bedtime          Past History     Past Medical History:  Past Medical History:   Diagnosis Date    [facility]ism ([facility] [address]     Drug addiction ([facility] [address] [facility]     Hepatitis C     Psychiatric disorder     Psychiatric disorder        Past Surgical History:  Past Surgical History:   Procedure Laterality Date    HX ACL RECONSTRUCTION      right knee    HX TONSILLECTOMY      INNER EAR SURGERY PROC UNLISTED         Family History:  No family history on file.     Social History:  Social History   Substance Use Topics    Smoking status: Current Every Day Smoker    Smokeless tobacco: Never Used    Alcohol use No      Comment: No alcohol in 4 months       Allergies: Allergies   Allergen Reactions    Tylenol [Acetaminophen] Other (comments)     Hepatitis          Review of Systems   Review of Systems   Constitutional: Negative. HENT: Negative. Eyes: Negative. Respiratory: Negative. Cardiovascular: Negative. Gastrointestinal: Negative. Genitourinary: Negative. Musculoskeletal: Negative. Skin: Negative. Neurological: Negative. All other systems reviewed and are negative. Physical Exam   Physical Exam   Constitutional: She is oriented to person, place, and time. She appears well-developed and well-nourished. No distress. Pleasant 32 yo  female   HENT:   Head: Normocephalic and atraumatic. Eyes: Conjunctivae are normal. Right eye exhibits no discharge. Left eye exhibits no discharge. Neck: Normal range of motion. Neck supple. Cardiovascular: Normal rate. Pulmonary/Chest: Effort normal.   Neurological: She is alert and oriented to person, place, and time. Skin: Skin is warm and dry. No rash noted. She is not diaphoretic. Psychiatric: She has a normal mood and affect. Her behavior is normal.   Nursing note and vitals reviewed. Medical Decision Making   I am the first provider for this patient. I reviewed the vital signs, available nursing notes, past medical history, past surgical history, family history and social history. Vital Signs-Reviewed the patient's vital signs. Patient Vitals for the past 12 hrs:   Temp Pulse Resp BP SpO2   05/29/18 1322 98.3 °F (36.8 °C) 90 16 142/88 100 %     Records Reviewed: Nursing Notes and Old Medical Records    Provider Notes (Medical Decision Making):   DDx: Rabies vaccination. ED Course:   Initial assessment performed. The patients presenting problems have been discussed, and they are in agreement with the care plan formulated and outlined with them.   I have encouraged them to ask questions as they arise throughout their visit. Progress Note:  3:22 PM  Pt has received her vaccination, will discharge. Pt was informed of her follow up dates for the coming vaccinations in the series. Written by Michelle Cooper, ED Scribe, as dictated by JOEY Goddard Time: 0 minutes    Discharge Note:  3:22 PM  The pt is ready for discharge. The pt's signs, symptoms, diagnosis, and discharge instructions have been discussed and pt has conveyed their understanding. The pt is to follow up as recommended or return to ER should their symptoms worsen. Plan has been discussed and pt is in agreement. PLAN:  1. Discharge Medication List as of 5/29/2018  3:14 PM        2. Follow-up Information     Follow up With Details Comments Contact Info    [facility] EMERGENCY DEPT In 3 days for your next immunization [address] [place] [address]  [address] [facility]  [phone]        3. Return to ED for rabies vaccination series    Return to ED if worse     Diagnosis     Clinical Impression:   1. Rabies, need for prophylactic vaccination against        Attestations: This note is prepared by Michelle Cooper, acting as a Scribe for JOEY Delgado: The scribe's documentation has been prepared under my direction and personally reviewed by me in its entirety. I confirm that the notes above accurately reflects all work, treatment, procedures, and medical decision making performed by me. This note will not be viewable in [address]

## 2018-05-29 NOTE — Clinical Note
Return on the following dates to complete your rabies series:  
Friday, June 1st Tuesday, June 5th Tuesday, June 12th

## 2018-06-01 ENCOUNTER — HOSPITAL ENCOUNTER (EMERGENCY)
Age: 28
Discharge: HOME | End: 2018-06-01
Attending: EMERGENCY MEDICINE
Payer: MEDICAID

## 2018-06-01 VITALS
HEART RATE: 102 BPM | WEIGHT: 188.49 LBS | DIASTOLIC BLOOD PRESSURE: 67 MMHG | RESPIRATION RATE: 18 BRPM | BODY MASS INDEX: 32.18 KG/M2 | OXYGEN SATURATION: 100 % | HEIGHT: 64 IN | SYSTOLIC BLOOD PRESSURE: 133 MMHG | TEMPERATURE: 98.8 F

## 2018-06-01 DIAGNOSIS — Z23 ENCOUNTER FOR REPEAT ADMINISTRATION OF RABIES VACCINATION: Primary | ICD-10-CM

## 2018-06-01 PROCEDURE — 99282 EMERGENCY DEPT VISIT SF MDM: CPT

## 2018-06-01 PROCEDURE — 90471 IMMUNIZATION ADMIN: CPT

## 2018-06-01 PROCEDURE — 90675 RABIES VACCINE IM: CPT | Performed by: PHYSICIAN ASSISTANT

## 2018-06-01 PROCEDURE — 74011250636 HC RX REV CODE- 250/636: Performed by: PHYSICIAN ASSISTANT

## 2018-06-01 RX ADMIN — RABIES VACCINE 2.5 UNITS: KIT at 09:37

## 2018-06-01 NOTE — ED NOTES
NADINE Betancourt reviewed discharge instructions with the patient. The patient verbalized understanding.

## 2018-06-01 NOTE — DISCHARGE INSTRUCTIONS
Thank you!     Thank you for allowing us to provide you with excellent care today. We hope we addressed all of your concerns and needs. We strive to provide excellent quality care in the Emergency Department. You will receive a survey after your visit to evaluate the care you were provided.      Please rate us a level 5 (excellent), as anything less than excellent does not meet our goals.      If you feel that you have not received excellent quality care or timely care, please ask to speak to the nurse manager. Please choose us in the future for your continued health care needs. ______________________________________________________________________    The exam and treatment you received in the Emergency Department were for an urgent problem and are not intended as complete care. It is important that you follow-up with a doctor, nurse practitioner, or physician assistant to:  (1) confirm your diagnosis,  (2) re-evaluation of changes in your illness and treatment, and  (3) for ongoing care. If your symptoms become worse or you do not improve as expected and you are unable to reach your usual health care provider, you should return to the Emergency Department. We are available 24 hours a day. Take this sheet with you when you go to your follow-up visit. If you have any problem arranging the follow-up visit, contact [address] [place] [address] 21 [phone])    Make an appointment with your Primary Care doctor for follow up of this visit. Return to the ER if you are unable to be seen in the time recommended on your discharge instructions. Preventing Rabies Infection: Care Instructions  Your Care Instructions    Rabies is a disease caused by a virus that can affect the brain and nervous system. You can get rabies when you are exposed to an animal that has rabies. This can happen through a bite, scratch, or other contact. Your doctor can use two medicines to help your body fight the virus before it causes an infection.  One is rabies immunoglobulin. It works by giving your body a type of protein called an antibody to stop the rabies virus. The other medicine is the rabies vaccine. It helps your body produce its own protection against the rabies virus. When you get these shots before serious symptoms appear, you should not get infected with rabies. Your doctor will give you a shot schedule. Make sure that you do not miss any doses. You need to get all the doses for the rabies vaccine to work. If you are exposed and have not been vaccinated against rabies, you should get 4 doses of rabies vaccine. · Get 1 dose right away. You should also get a rabies immunoglobulin shot when you get the first dose. · Get more doses on the 3rd, 7th, and 14th days. If you're exposed and have been vaccinated before, you should get 2 doses of rabies vaccine. · Get 1 dose right away. In this case, you do not need rabies immunoglobulin. · Get another on the 3rd day. You might also get a shot to prevent rabies if you handle animals often. Or you may get one if you plan to travel to places where rabies is a risk. Follow-up care is a key part of your treatment and safety. Be sure to make and go to all appointments, and call your doctor if you are having problems. It's also a good idea to know your test results and keep a list of the medicines you take. How can you care for yourself at home? · Do not drive or use machines if the rabies vaccine makes you dizzy. · Both types of rabies shots may cause fever. And both may cause pain or stiffness where you got the shot. If your doctor recommends it, take an over-the-counter pain medicine, such as acetaminophen (Tylenol), ibuprofen (Advil, Motrin), or naproxen (Aleve), as needed. Read and follow all instructions on the label. · Do not take two or more pain medicines at the same time unless the doctor told you to. Many pain medicines have acetaminophen, which is Tylenol.  Too much acetaminophen (Tylenol) can be harmful. To prevent contact with rabies  · Make sure your dog, cat, or ferret gets the rabies vaccine. · Avoid all contact with bats. · Never touch or try to pet or catch wild animals. This includes raccoons, skunks, foxes, and coyotes. · Secure trash and other items that attract animals. · Secure open areas of your home. Close off pet doors, chimneys, unscreened windows, or any place that wild or stray animals could get in. · Never handle a dead or wounded animal.  To take care of an animal bite  · Wash any animal bite or area of exposure right away. Use soap and water. · Call your doctor to find out how to care for your wound. · If the animal is a dog, cat, or pet ferret, try to find and contact the owner. If you can't find the owner, call the local animal control to safely catch the animal.  · If the animal is wild, do not try to catch or kill it. Find out what type of animal it is. Note whether it is acting normal. Report it to the local animal control. · Contact the local or state health department to report a bite or a severe scratch from an animal.  · Ask your doctor if you need a tetanus shot. When should you call for help? Watch closely for changes in your health, and be sure to contact your doctor if you have any problems. Where can you learn more? Go to http://queta-maxine.info/. Enter U105 in the search box to learn more about \"Preventing Rabies Infection: Care Instructions. \"  Current as of: March 3, 2017  Content Version: 11.4  © 0834-9832 [employer]. Care instructions adapted under license by [employer] (which disclaims liability or warranty for this information). If you have questions about a medical condition or this instruction, always ask your healthcare professional. [address] any warranty or liability for your use of this information.

## 2018-06-01 NOTE — ED PROVIDER NOTES
EMERGENCY DEPARTMENT HISTORY AND PHYSICAL EXAM      Date: 2018  Patient Name: Yunier Trejo    History of Presenting Illness     Chief Complaint   Patient presents with    Immunization/Injection     2nd rabies vaccination       History Provided By: Patient    HPI: Yunier Trejo, 33 y.o. female (/A0) and 24 weeks pregnant with PMHx significant for alcoholism, drug addiction, hep C, presents ambulatory to the ED for 2nd round of Rabies vaccine. Per chart review and per patient, she was seen at [facility] ED on  after a bat flew into her house. Animal control was called, patient received 1st round of Rabies vaccine that day in the ED. Since the first vaccination, she denies any complications and specifically denies any new rashes, lesions or wounds to her body. She denies fever or vomiting. She is otherwise without complaints. Given that the pt is asymptomatic and presents for a vaccine booster, there is no applicable location, quality, duration, severity, timing, context, associated sxs, additional context, or modifying factors. Social History: (+) smoking, (-) alcohol, (-) illicit drugs    There are no other complaints, changes, or physical findings at this time. PCP: None    Current Outpatient Prescriptions   Medication Sig Dispense Refill    [id number] fum-folic ac-om3 (ONE A DAY WOMEN'S PRENATAL DHA) 28 mg iron- 800 mcg cmpk Take 1 Tab by mouth daily.  acetaminophen (TYLENOL EXTRA STRENGTH) 500 mg tablet Take 2 Tabs by mouth every six (6) hours as needed for Pain or Fever. 30 Tab 0    ibuprofen (MOTRIN) 600 mg tablet Take 1 Tab by mouth every six (6) hours as needed for Pain (Fever). 20 Tab 0    fluoxetine (PROZAC) 10 mg capsule Take 10 mg by mouth daily.  lithium carbonate (LITHOBID) 600 mg capsule Take 600 mg by mouth daily.  FLUPHENAZINE HCL (PROLIXIN PO) Take  by mouth.  2 tablets at bedtime       ziprasidone (GEODON) 40 mg capsule Take 40 mg by mouth two (2) times daily (with meals).  BENZTROPINE MESYLATE (BENZTROPINE PO) Take  by mouth. One tablet in the AM and 2 at bedtime          Past History     Past Medical History:  Past Medical History:   Diagnosis Date    [facility] [address]     Drug addiction ([facility] [address]     Hepatitis C     Psychiatric disorder     Psychiatric disorder        Past Surgical History:  Past Surgical History:   Procedure Laterality Date    HX ACL RECONSTRUCTION      right knee    HX TONSILLECTOMY      INNER EAR SURGERY PROC UNLISTED         Family History:  No family history on file. Social History:  Social History   Substance Use Topics    Smoking status: Current Every Day Smoker    Smokeless tobacco: Never Used    Alcohol use No      Comment: No alcohol in 4 months       Allergies: Allergies   Allergen Reactions    Tylenol [Acetaminophen] Other (comments)     Hepatitis          Review of Systems   Review of Systems   Constitutional: Negative for chills and fever. HENT: Negative for sore throat. Eyes: Negative for pain. Respiratory: Negative for cough and shortness of breath. Cardiovascular: Negative for chest pain. Gastrointestinal: Negative for abdominal pain, diarrhea, nausea and vomiting. Genitourinary: Negative for dysuria and hematuria. Musculoskeletal: Negative for arthralgias and myalgias. Skin: Negative for rash. Neurological: Negative for dizziness, light-headedness, numbness and headaches. Psychiatric/Behavioral: Negative for behavioral problems and confusion. Physical Exam   Physical Exam   Constitutional: She is oriented to person, place, and time. She appears well-developed and well-nourished. No distress. HENT:   Head: Normocephalic and atraumatic. Right Ear: External ear normal.   Left Ear: External ear normal.   Nose: Nose normal.   Eyes: Conjunctivae and EOM are normal.   Neck: Normal range of motion. Neck supple. Cardiovascular: Normal rate, regular rhythm and normal heart sounds. No murmur heard. Pulmonary/Chest: Effort normal and breath sounds normal. She has no decreased breath sounds. She has no wheezes. Abdominal: Soft. Bowel sounds are normal. She exhibits no distension. There is no tenderness. There is no guarding. Musculoskeletal: Normal range of motion. She exhibits no edema or tenderness. Neurological: She is alert and oriented to person, place, and time. Skin: Skin is warm and dry. No rash noted. She is not diaphoretic. Psychiatric: She has a normal mood and affect. Her behavior is normal. Judgment normal.   Nursing note and vitals reviewed. Medical Decision Making   I am the first provider for this patient. I reviewed the vital signs, available nursing notes, past medical history, past surgical history, family history and social history. Vital Signs-Reviewed the patient's vital signs. Patient Vitals for the past 12 hrs:   Temp Pulse Resp BP SpO2   06/01/18 0917 98.8 °F (37.1 °C) (!) 102 18 133/67 100 %       Records Reviewed: Old Medical Records    Provider Notes (Medical Decision Making):   DDx: need for 2nd round of rabies vaccine     ED Course:   Initial assessment performed. The patients presenting problems have been discussed, and they are in agreement with the care plan formulated and outlined with them. I have encouraged them to ask questions as they arise throughout their visit. Medications   rabies vaccine, PCEC (RABAVERT) kit 2.5 Units (2.5 Units IntraMUSCular Given 6/1/18 0937)       Disposition:  Discharge Note:  9:44 AM  The pt is ready for discharge. The pt's signs, symptoms, diagnosis, and discharge instructions have been discussed and pt has conveyed their understanding. The pt is to follow up as recommended or return to ER should their symptoms worsen. Plan has been discussed and pt is in agreement. This note is prepared by Barrie Epps, acting as a Scribe for Nay Johnson PA-C.     Nay Johnson PA-C: The scribe's documentation has been prepared under my direction and personally reviewed by me in its entirety. I confirm that the notes above accurately reflects all work, treatment, procedures, and medical decision making performed by me. PLAN:  1. Discharge home  2. Medications as directed  3. Schedule f/u with PCP  4. Return precautions reviewed    Discharge Medication List as of 6/1/2018  9:42 AM        2. Follow-up Information     Follow up With Details Comments Contact Info    [facility] EMERGENCY DEPT On 6/5/2018 For 3rd rabies shot [address] [place] [address]  [phone]        Return to ED if worse     Diagnosis     Clinical Impression:   1. Encounter for repeat administration of rabies vaccination        Attestations: This note is prepared by Meryln Alba, acting as a Scribe for Toña Garcia PA-C. Toña Garcia PA-C: The scribe's documentation has been prepared under my direction and personally reviewed by me in its entirety. I confirm that the notes above accurately reflects all work, treatment, procedures, and medical decision making performed by me. This note will not be viewable in [address]

## 2018-06-05 ENCOUNTER — HOSPITAL ENCOUNTER (EMERGENCY)
Age: 28
Discharge: HOME | End: 2018-06-05
Attending: EMERGENCY MEDICINE
Payer: MEDICAID

## 2018-06-05 VITALS
WEIGHT: 189.6 LBS | DIASTOLIC BLOOD PRESSURE: 62 MMHG | OXYGEN SATURATION: 99 % | HEIGHT: 64 IN | HEART RATE: 95 BPM | RESPIRATION RATE: 16 BRPM | SYSTOLIC BLOOD PRESSURE: 126 MMHG | BODY MASS INDEX: 32.37 KG/M2 | TEMPERATURE: 97.7 F

## 2018-06-05 DIAGNOSIS — Z23 NEED FOR IMMUNIZATION AGAINST RABIES: Primary | ICD-10-CM

## 2018-06-05 PROCEDURE — 99282 EMERGENCY DEPT VISIT SF MDM: CPT

## 2018-06-05 PROCEDURE — 90471 IMMUNIZATION ADMIN: CPT

## 2018-06-05 PROCEDURE — 90675 RABIES VACCINE IM: CPT | Performed by: PHYSICIAN ASSISTANT

## 2018-06-05 PROCEDURE — 74011250636 HC RX REV CODE- 250/636: Performed by: PHYSICIAN ASSISTANT

## 2018-06-05 RX ADMIN — RABIES VACCINE 2.5 UNITS: KIT at 10:48

## 2018-06-05 NOTE — ED PROVIDER NOTES
EMERGENCY DEPARTMENT HISTORY AND PHYSICAL EXAM      Date: 6/5/2018  Patient Name: Rox Bach    History of Presenting Illness     Chief Complaint   Patient presents with    Immunization/Injection     here for 3rd rabies vaccine       History Provided By: Patient    HPI: Rox Bach, 33 y.o. female with PMHx significant for Hep C, drug addiction and alcoholism, presents ambulatory with daughter to the ED for 3rd rabies vaccine. The patient states that she had a bat flying around her bedroom ~ 1 week ago. She denies being bitten by the bat. She states that she was able to get rid of the bat, and she reports Animal Control visiting her home this morning, prior to arrival. Given that the pt is currently asymptomatic, there is no applicable location, quality, duration, severity, timing, context, associated sxs, additional context, or modifying factors. There are no other complaints, changes, or physical findings at this time. PCP: None    Current Facility-Administered Medications   Medication Dose Route Frequency Provider Last Rate Last Dose    rabies vaccine, PCEC (RABAVERT) kit 2.5 Units  1 mL IntraMUSCular NOW NADINE Roland         Current Outpatient Prescriptions   Medication Sig Dispense Refill    [id number] fum-folic ac-om3 (ONE A DAY WOMEN'S PRENATAL DHA) 28 mg iron- 800 mcg cmpk Take 1 Tab by mouth daily.  acetaminophen (TYLENOL EXTRA STRENGTH) 500 mg tablet Take 2 Tabs by mouth every six (6) hours as needed for Pain or Fever. 30 Tab 0    ibuprofen (MOTRIN) 600 mg tablet Take 1 Tab by mouth every six (6) hours as needed for Pain (Fever). 20 Tab 0    fluoxetine (PROZAC) 10 mg capsule Take 10 mg by mouth daily.  lithium carbonate (LITHOBID) 600 mg capsule Take 600 mg by mouth daily.  FLUPHENAZINE HCL (PROLIXIN PO) Take  by mouth. 2 tablets at bedtime       ziprasidone (GEODON) 40 mg capsule Take 40 mg by mouth two (2) times daily (with meals).       BENZTROPINE MESYLATE (BENZTROPINE PO) Take  by mouth. One tablet in the AM and 2 at bedtime          Past History     Past Medical History:  Past Medical History:   Diagnosis Date    Alcoholism ([facility] [address]     Drug addiction ([facility] [address]     Hepatitis C     Psychiatric disorder     Psychiatric disorder        Past Surgical History:  Past Surgical History:   Procedure Laterality Date    HX ACL RECONSTRUCTION      right knee    HX TONSILLECTOMY      INNER EAR SURGERY PROC UNLISTED         Family History:  No family history on file. Social History:  Social History   Substance Use Topics    Smoking status: Current Every Day Smoker    Smokeless tobacco: Never Used    Alcohol use No      Comment: No alcohol in 4 months       Allergies: Allergies   Allergen Reactions    Tylenol [Acetaminophen] Other (comments)     Hepatitis          Review of Systems   Review of Systems   Constitutional: Negative. Negative for activity change, appetite change, chills and fever. HENT: Negative for rhinorrhea and sore throat. Eyes: Negative for pain and visual disturbance. Respiratory: Negative for cough, shortness of breath and wheezing. Cardiovascular: Negative for chest pain, palpitations and leg swelling. Gastrointestinal: Negative for abdominal pain, diarrhea, nausea and vomiting. Genitourinary: Negative for dysuria and hematuria. Musculoskeletal: Negative for arthralgias and myalgias. Skin: Negative for color change, rash and wound. Neurological: Negative for dizziness and headaches. All other systems reviewed and are negative. Physical Exam   Physical Exam   Constitutional: She is oriented to person, place, and time. Vital signs are normal. She appears well-developed and well-nourished. No distress. 33 y.o. female in NAD  Communicates appropriately and in full sentences  Normal vital signs   HENT:   Head: Normocephalic and atraumatic. Eyes: Conjunctivae are normal. Pupils are equal, round, and reactive to light.  Right eye exhibits no discharge. Left eye exhibits no discharge. Neck: Normal range of motion. Neck supple. No nuchal rigidity   Cardiovascular: Normal rate, regular rhythm and intact distal pulses. Pulmonary/Chest: Effort normal and breath sounds normal. No respiratory distress. She has no wheezes. Abdominal: Soft. Bowel sounds are normal. She exhibits no distension. There is no tenderness. Musculoskeletal: Normal range of motion. She exhibits no edema, tenderness or deformity. No neurologic, motor, vascular, or compartment embarrassment observed on exam. No focal neurologic deficits. Neurological: She is alert and oriented to person, place, and time. Coordination normal.   Skin: Skin is warm and dry. No rash noted. She is not diaphoretic. No erythema. No pallor. Psychiatric: She has a normal mood and affect. Nursing note and vitals reviewed. Medical Decision Making   I am the first provider for this patient. I reviewed the vital signs, available nursing notes, past medical history, past surgical history, family history and social history. Vital Signs-Reviewed the patient's vital signs. Patient Vitals for the past 12 hrs:   Temp Pulse Resp BP SpO2   06/05/18 1014 97.7 °F (36.5 °C) 95 16 126/62 99 %       Pulse Oximetry Analysis - 99% on RA    Cardiac Monitor:   Rate: 95 bpm    Records Reviewed: Old Medical Records    Provider Notes (Medical Decision Making):   Encounter for rabies vaccine    Continue rabies post exposure prophylaxis series. Dose #1 was 05/29/2018  Dose #2 of #4 s due on 06/01/2018  Dose #3 of #4 is due on 06/05/2018  Dose #4 of #4 s due on 06/12/2018    A four dose intramuscular rabies immunization on days 0, 3, 7 and 14 is recommended for those who receive wound care plus high-quality RIG plus rabies vaccine. ED Course:   Initial assessment performed.  The patients presenting problems have been discussed, and they are in agreement with the care plan formulated and outlined with them. I have encouraged them to ask questions as they arise throughout their visit. TOBACCO COUNSELING:  Upon evaluation, pt expressed that they are a current tobacco user. Pt has been counseled on the dangers of smoking and was encouraged to quit as soon as possible in order to decrease further risks to their health. Pt has conveyed their understanding of the risks involved should they continue to use tobacco products. Critical Care Time:   None     Disposition:  Discharge Note:  10:52 AM  The pt is ready for discharge. The pt's signs, symptoms, diagnosis, and discharge instructions have been discussed and pt has conveyed their understanding. The pt is to follow up as recommended or return to ER should their symptoms worsen. Plan has been discussed and pt is in agreement. PLAN:  1. Current Discharge Medication List        2. Follow-up Information     Follow up With Details Comments Contact Info    None Schedule an appointment as soon as possible for a visit in 2 days As needed, Possible further evaluation and treatment, If symptoms worsen None ([phone]) Patient stated that they have no PCP          Return to ED if worse     Diagnosis     Clinical Impression:   1. Need for immunization against rabies        Attestations:    Attestations: This note is prepared by Shane Rodriguez, acting as Scribe for [facility] [address] [address], PA-C: The scribe's documentation has been prepared under my direction and personally reviewed by me in its entirety. I confirm that the note above accurately reflects all work, treatment, procedures, and medical decision making performed by me. This note will not be viewable in [address]

## 2018-06-05 NOTE — DISCHARGE INSTRUCTIONS
Rabies Vaccine (By injection)   Rabies Vaccine (RAY-beez VAX-een)  Prevents infection caused by rabies virus. The vaccine can be given before or after you are exposed to the rabies virus. Brand Name(s): Imovax Rabies, RabAvert   There may be other brand names for this medicine. When This Medicine Should Not Be Used: You should not receive a rabies vaccine if you have had an allergic reaction to it before. How to Use This Medicine:   Injectable  · Your doctor will prescribe your exact dose and tell you how often it should be given. This medicine is given as a shot into one of your muscles. The vaccine is injected into the upper arm muscle. Very young or small children may have the vaccine injected into the upper leg muscle. · A nurse or other health provider will give you this medicine. · You are at risk for exposure to the rabies virus if you work with animals or will be going to a country where rabies is common. People who are at risk of being exposed to rabies will receive 3 doses on 3 different days within a 1-month period. · If you have received the vaccine in the past and have been exposed to the rabies virus, you will need to receive 2 doses on 2 different days within a 1-month period. · If you have not yet received the vaccine and were exposed to the rabies virus, you will need a total of 5 doses on 5 different days within a 1-month period. You will also receive a shot of rabies immune globulin. · It is very important that you have the shots on the exact day your doctor tells you to. If a dose is missed:   · You must use this medicine on a fixed schedule. Call your doctor or pharmacist if you miss a dose. Drugs and Foods to Avoid:   Ask your doctor or pharmacist before using any other medicine, including over-the-counter medicines, vitamins, and herbal products.   · Tell your doctor before you receive this vaccine if you take medicine that weakens your immune system, such as a steroid (such as dexamethasone, hydrocortisone, methylprednisolone, prednisolone, prednisone, Medrol®) or cancer treatment. Warnings While Using This Medicine:   · Make sure your doctor knows if you are pregnant or breastfeeding. Tell your doctor if you have had an allergic reaction to any type of vaccine, if you have an illness with fever, or if you have an immune system problem. · This medicine is made from donated human blood. Some human blood products have transmitted viruses to people who have received them, although the risk is low. Human donors and donated blood are both tested for viruses to keep the transmission risk low. Talk with your médico about this risk if you are concerned. · Your doctor will do lab tests at regular visits to check on the effects of this medicine. Keep all appointments. Possible Side Effects While Using This Medicine:   Call your doctor right away if you notice any of these side effects:  · Allergic reaction: Itching or hives, swelling in your face or hands, swelling or tingling in your mouth or throat, chest tightness, trouble breathing  · Muscle pain, stiffness, or weakness  · Numbness, tingling, or burning pain in your hands, arms, legs, or feet  If you notice these less serious side effects, talk with your doctor:   · Dizziness, headache  · Fever  · Itching, pain, redness, or swelling where the shot was given  · Nausea, stomach pain  · Tiredness  If you notice other side effects that you think are caused by this medicine, tell your doctor. Call your doctor for medical advice about side effects. You may report side effects to FDA at 7-281-FDA-5040  © 2017 [facility] Information is for End User's use only and may not be sold, redistributed or otherwise used for commercial purposes. The above information is an  only. It is not intended as medical advice for individual conditions or treatments.  Talk to your doctor, nurse or pharmacist before following any medical regimen to see if it is safe and effective for you.

## 2018-06-12 ENCOUNTER — HOSPITAL ENCOUNTER (EMERGENCY)
Age: 28
Discharge: HOME | End: 2018-06-12
Attending: EMERGENCY MEDICINE
Payer: MEDICAID

## 2018-06-12 VITALS
BODY MASS INDEX: 32.71 KG/M2 | RESPIRATION RATE: 16 BRPM | HEIGHT: 64 IN | DIASTOLIC BLOOD PRESSURE: 63 MMHG | OXYGEN SATURATION: 99 % | TEMPERATURE: 98.1 F | SYSTOLIC BLOOD PRESSURE: 123 MMHG | WEIGHT: 191.58 LBS

## 2018-06-12 DIAGNOSIS — Z23 NEED FOR RABIES VACCINATION: Primary | ICD-10-CM

## 2018-06-12 PROCEDURE — 90471 IMMUNIZATION ADMIN: CPT

## 2018-06-12 PROCEDURE — 90675 RABIES VACCINE IM: CPT | Performed by: PHYSICIAN ASSISTANT

## 2018-06-12 PROCEDURE — 74011250636 HC RX REV CODE- 250/636: Performed by: PHYSICIAN ASSISTANT

## 2018-06-12 PROCEDURE — 99282 EMERGENCY DEPT VISIT SF MDM: CPT

## 2018-06-12 RX ADMIN — RABIES VACCINE 2.5 UNITS: KIT at 09:46

## 2018-06-12 NOTE — ED PROVIDER NOTES
EMERGENCY DEPARTMENT HISTORY AND PHYSICAL EXAM      Date: 6/12/2018  Patient Name: Jeff Juarez    History of Presenting Illness     Chief Complaint   Patient presents with    Immunization/Injection     Ambulatory here for last rabies vaccine     History Provided By: Patient    HPI: Jeff Juarez, 33 y.o. female with PMHx significant for hepatitis C and alcohol abuse, presents ambulatory to the ED with concern for a rabies vaccine. Per pt, she was exposed to a bat on 05/29/2018. The pt informs she has been asymptomatic since the incident, but she initially requested prophylactic treatment given the unwitnessed incident. Pt informs she presents to the ED today for the last rabies vaccination. As there are no physical symptoms or complaints of pain, there are no modifying factors, severity, quality, duration, or associated symptoms. Pt specifically denies any current fevers, vomiting, nausea, vomiting, headaches, or rash. PSHx: tonsillectomy, R ACL reconstruction   Social Hx: + EtOH; + Smoker; Former Illicit Drugs (heroin, cocaine)    PCP: None    There are no other complaints, changes, or physical findings at this time. Current Outpatient Prescriptions   Medication Sig Dispense Refill    [id number] fum-folic ac-om3 (ONE A DAY WOMEN'S PRENATAL DHA) 28 mg iron- 800 mcg cmpk Take 1 Tab by mouth daily.  acetaminophen (TYLENOL EXTRA STRENGTH) 500 mg tablet Take 2 Tabs by mouth every six (6) hours as needed for Pain or Fever. 30 Tab 0    ibuprofen (MOTRIN) 600 mg tablet Take 1 Tab by mouth every six (6) hours as needed for Pain (Fever). 20 Tab 0    fluoxetine (PROZAC) 10 mg capsule Take 10 mg by mouth daily.  lithium carbonate (LITHOBID) 600 mg capsule Take 600 mg by mouth daily.  FLUPHENAZINE HCL (PROLIXIN PO) Take  by mouth. 2 tablets at bedtime       ziprasidone (GEODON) 40 mg capsule Take 40 mg by mouth two (2) times daily (with meals).       BENZTROPINE MESYLATE (BENZTROPINE PO) Take  by mouth. One tablet in the AM and 2 at bedtime        Past History     Past Medical History:  Past Medical History:   Diagnosis Date    [facility] ([facility] [address]     Drug addiction ([facility] [address]     Hepatitis C     Psychiatric disorder     Psychiatric disorder      Past Surgical History:  Past Surgical History:   Procedure Laterality Date    HX ACL RECONSTRUCTION      right knee    HX TONSILLECTOMY      INNER EAR SURGERY PROC UNLISTED       Family History:  History reviewed. No pertinent family history. Social History:  Social History   Substance Use Topics    Smoking status: Current Every Day Smoker     Packs/day: 0.25    Smokeless tobacco: Never Used    Alcohol use No      Comment: No alcohol in 4 months     Allergies: Allergies   Allergen Reactions    Tylenol [Acetaminophen] Other (comments)     Hepatitis      Review of Systems   Review of Systems   Constitutional: Negative for chills and fever. HENT: Negative for rhinorrhea and sore throat. Eyes: Negative for visual disturbance. Respiratory: Negative for cough and shortness of breath. Cardiovascular: Negative for chest pain. Gastrointestinal: Negative for abdominal pain, constipation, diarrhea, nausea and vomiting. Genitourinary: Negative for dysuria, frequency, hematuria and urgency. Musculoskeletal: Negative for back pain. Skin: Negative for wound. Neurological: Negative for headaches. All other systems reviewed and are negative. Physical Exam   Physical Exam   Constitutional: She is oriented to person, place, and time. She appears well-developed and well-nourished. No distress. 32 yo  female   HENT:   Head: Normocephalic and atraumatic. Eyes: Conjunctivae are normal. Right eye exhibits no discharge. Left eye exhibits no discharge. Neck: Normal range of motion. Neck supple. Cardiovascular: Normal rate, regular rhythm and normal heart sounds. No murmur heard.   Pulmonary/Chest: Effort normal and breath sounds normal. No respiratory distress. She has no wheezes. Neurological: She is alert and oriented to person, place, and time. Skin: Skin is warm and dry. No rash noted. She is not diaphoretic. Psychiatric: She has a normal mood and affect. Her behavior is normal.   Nursing note and vitals reviewed. Medical Decision Making   I am the first provider for this patient. I reviewed the vital signs, available nursing notes, past medical history, past surgical history, family history and social history. Vital Signs-Reviewed the patient's vital signs. Patient Vitals for the past 12 hrs:   Temp Resp BP SpO2   06/12/18 0942 98.1 °F (36.7 °C) 16 123/63 99 %     Records Reviewed: Nursing Notes and Old Medical Records    ED Course:   Initial assessment performed. The patients presenting problems have been discussed, and they are in agreement with the care plan formulated and outlined with them. I have encouraged them to ask questions as they arise throughout their visit. TOBACCO COUNSELING:  Upon evaluation, pt expressed that they are a current tobacco user. Pt has been counseled on the dangers of smoking and was encouraged to quit as soon as possible in order to decrease further risks to their health. Pt has conveyed their understanding of the risks involved should they continue to use tobacco products. This note is prepared by KIKO Urias, as dictated by Forrest Zurita.    Disposition:  DISCHARGE NOTE:    10:11 AM  The patient is ready for discharge. The patient signs, symptoms, diagnosis, and discharge instructions have been discussed and the patient has conveyed their understanding. The patient is to follow-up as reccommended or returned to the ER should their symptoms worsen. Plan has been discussed and patient is in agreement. PLAN:  1.    Current Discharge Medication List      CONTINUE these medications which have NOT CHANGED    Details   [id number] fum-folic ac-om3 (ONE A DAY WOMEN'S PRENATAL DHA) 28 mg iron- 800 mcg cmpk Take 1 Tab by mouth daily. acetaminophen (TYLENOL EXTRA STRENGTH) 500 mg tablet Take 2 Tabs by mouth every six (6) hours as needed for Pain or Fever. Qty: 30 Tab, Refills: 0      ibuprofen (MOTRIN) 600 mg tablet Take 1 Tab by mouth every six (6) hours as needed for Pain (Fever). Qty: 20 Tab, Refills: 0      fluoxetine (PROZAC) 10 mg capsule Take 10 mg by mouth daily. lithium carbonate (LITHOBID) 600 mg capsule Take 600 mg by mouth daily. FLUPHENAZINE HCL (PROLIXIN PO) Take  by mouth. 2 tablets at bedtime       ziprasidone (GEODON) 40 mg capsule Take 40 mg by mouth two (2) times daily (with meals). BENZTROPINE MESYLATE (BENZTROPINE PO) Take  by mouth. One tablet in the AM and 2 at bedtime            2.   Follow-up Information     Follow up With Details Comments Contact Info    Your PCP  As needed         Return to ED if worse     Diagnosis     Clinical Impression:   1. Need for rabies vaccination      Attestations: This note is prepared by Nader Alford, acting as Scribe for JOEY Augustin : The scribe's documentation has been prepared under my direction and personally reviewed by me in its entirety. I confirm that the note above accurately reflects all work, treatment, procedures, and medical decision making performed by me.    2:31 PM  I was personally available for consultation in the emergency department. I have reviewed the chart and agree with the documentation recorded by the [facility], including the assessment, treatment plan, and disposition. Nenita Donovan MD      This note will not be viewable in [address]

## 2018-06-12 NOTE — DISCHARGE INSTRUCTIONS
Preventing Rabies Infection: Care Instructions  Your Care Instructions    Rabies is a disease caused by a virus that can affect the brain and nervous system. You can get rabies when you are exposed to an animal that has rabies. This can happen through a bite, scratch, or other contact. Your doctor can use two medicines to help your body fight the virus before it causes an infection. One is rabies immunoglobulin. It works by giving your body a type of protein called an antibody to stop the rabies virus. The other medicine is the rabies vaccine. It helps your body produce its own protection against the rabies virus. When you get these shots before serious symptoms appear, you should not get infected with rabies. Your doctor will give you a shot schedule. Make sure that you do not miss any doses. You need to get all the doses for the rabies vaccine to work. If you are exposed and have not been vaccinated against rabies, you should get 4 doses of rabies vaccine. · Get 1 dose right away. You should also get a rabies immunoglobulin shot when you get the first dose. · Get more doses on the 3rd, 7th, and 14th days. If you're exposed and have been vaccinated before, you should get 2 doses of rabies vaccine. · Get 1 dose right away. In this case, you do not need rabies immunoglobulin. · Get another on the 3rd day. You might also get a shot to prevent rabies if you handle animals often. Or you may get one if you plan to travel to places where rabies is a risk. Follow-up care is a key part of your treatment and safety. Be sure to make and go to all appointments, and call your doctor if you are having problems. It's also a good idea to know your test results and keep a list of the medicines you take. How can you care for yourself at home? · Do not drive or use machines if the rabies vaccine makes you dizzy. · Both types of rabies shots may cause fever.  And both may cause pain or stiffness where you got the shot. If your doctor recommends it, take an over-the-counter pain medicine, such as acetaminophen (Tylenol), ibuprofen (Advil, Motrin), or naproxen (Aleve), as needed. Read and follow all instructions on the label. · Do not take two or more pain medicines at the same time unless the doctor told you to. Many pain medicines have acetaminophen, which is Tylenol. Too much acetaminophen (Tylenol) can be harmful. To prevent contact with rabies  · Make sure your dog, cat, or ferret gets the rabies vaccine. · Avoid all contact with bats. · Never touch or try to pet or catch wild animals. This includes raccoons, skunks, foxes, and coyotes. · Secure trash and other items that attract animals. · Secure open areas of your home. Close off pet doors, chimneys, unscreened windows, or any place that wild or stray animals could get in. · Never handle a dead or wounded animal.  To take care of an animal bite  · Wash any animal bite or area of exposure right away. Use soap and water. · Call your doctor to find out how to care for your wound. · If the animal is a dog, cat, or pet ferret, try to find and contact the owner. If you can't find the owner, call the local animal control to safely catch the animal.  · If the animal is wild, do not try to catch or kill it. Find out what type of animal it is. Note whether it is acting normal. Report it to the local animal control. · Contact the local or state health department to report a bite or a severe scratch from an animal.  · Ask your doctor if you need a tetanus shot. When should you call for help? Watch closely for changes in your health, and be sure to contact your doctor if you have any problems. Where can you learn more? Go to http://queta-maxine.info/. Enter U105 in the search box to learn more about \"Preventing Rabies Infection: Care Instructions. \"  Current as of: March 3, 2017  Content Version: 11.4  © 9752-3270 [employer], [employer].  Care instructions adapted under license by [employer] (which disclaims liability or warranty for this information). If you have questions about a medical condition or this instruction, always ask your healthcare professional. [address] any warranty or liability for your use of this information.

## 2018-08-04 ENCOUNTER — HOSPITAL ENCOUNTER (OUTPATIENT)
Age: 28
Setting detail: OBSERVATION
Discharge: HOME | End: 2018-08-05
Attending: SPECIALIST | Admitting: OBSTETRICS & GYNECOLOGY
Payer: MEDICAID

## 2018-08-04 PROBLEM — Z34.90 PREGNANCY: Status: ACTIVE | Noted: 2018-08-04

## 2018-08-04 LAB
APPEARANCE UR: CLEAR
BACTERIA URNS QL MICRO: NEGATIVE /HPF
BASOPHILS # BLD: 0 K/UL (ref 0–0.1)
BASOPHILS NFR BLD: 0 % (ref 0–1)
BILIRUB UR QL: NEGATIVE
CLUE CELLS VAG QL WET PREP: NORMAL
COLOR UR: ABNORMAL
DIFFERENTIAL METHOD BLD: ABNORMAL
EOSINOPHIL # BLD: 0.2 K/UL (ref 0–0.4)
EOSINOPHIL NFR BLD: 2 % (ref 0–7)
EPITH CASTS URNS QL MICRO: ABNORMAL /LPF
ERYTHROCYTE [DISTWIDTH] IN BLOOD BY AUTOMATED COUNT: 13.5 % (ref 11.5–14.5)
GLUCOSE UR STRIP.AUTO-MCNC: NEGATIVE MG/DL
HCT VFR BLD AUTO: 32.3 % (ref 35–47)
HGB BLD-MCNC: 10.9 G/DL (ref 11.5–16)
HGB UR QL STRIP: ABNORMAL
IMM GRANULOCYTES # BLD: 0.2 K/UL (ref 0–0.04)
IMM GRANULOCYTES NFR BLD AUTO: 2 % (ref 0–0.5)
KETONES UR QL STRIP.AUTO: NEGATIVE MG/DL
LEUKOCYTE ESTERASE UR QL STRIP.AUTO: NEGATIVE
LYMPHOCYTES # BLD: 2.1 K/UL (ref 0.8–3.5)
LYMPHOCYTES NFR BLD: 20 % (ref 12–49)
MCH RBC QN AUTO: 31.2 PG (ref 26–34)
MCHC RBC AUTO-ENTMCNC: 33.7 G/DL (ref 30–36.5)
MCV RBC AUTO: 92.6 FL (ref 80–99)
MONOCYTES # BLD: 0.6 K/UL (ref 0–1)
MONOCYTES NFR BLD: 6 % (ref 5–13)
NEUTS SEG # BLD: 7.5 K/UL (ref 1.8–8)
NEUTS SEG NFR BLD: 71 % (ref 32–75)
NITRITE UR QL STRIP.AUTO: NEGATIVE
NRBC # BLD: 0 K/UL (ref 0–0.01)
NRBC BLD-RTO: 0 PER 100 WBC
PH UR STRIP: 7 [PH] (ref 5–8)
PLATELET # BLD AUTO: 205 K/UL (ref 150–400)
PMV BLD AUTO: 11 FL (ref 8.9–12.9)
PROT UR STRIP-MCNC: NEGATIVE MG/DL
RBC # BLD AUTO: 3.49 M/UL (ref 3.8–5.2)
RBC #/AREA URNS HPF: ABNORMAL /HPF (ref 0–5)
SP GR UR REFRACTOMETRY: 1.01 (ref 1–1.03)
T VAGINALIS VAG QL WET PREP: NORMAL
UA: UC IF INDICATED,UAUC: ABNORMAL
UROBILINOGEN UR QL STRIP.AUTO: 0.2 EU/DL (ref 0.2–1)
WBC # BLD AUTO: 10.6 K/UL (ref 3.6–11)
WBC URNS QL MICRO: ABNORMAL /HPF (ref 0–4)

## 2018-08-04 PROCEDURE — 99218 HC RM OBSERVATION: CPT

## 2018-08-04 PROCEDURE — 85025 COMPLETE CBC W/AUTO DIFF WBC: CPT

## 2018-08-04 PROCEDURE — 96372 THER/PROPH/DIAG INJ SC/IM: CPT

## 2018-08-04 PROCEDURE — 87081 CULTURE SCREEN ONLY: CPT

## 2018-08-04 PROCEDURE — 74011250636 HC RX REV CODE- 250/636: Performed by: OBSTETRICS & GYNECOLOGY

## 2018-08-04 PROCEDURE — 81001 URINALYSIS AUTO W/SCOPE: CPT

## 2018-08-04 PROCEDURE — 36415 COLL VENOUS BLD VENIPUNCTURE: CPT

## 2018-08-04 PROCEDURE — 87210 SMEAR WET MOUNT SALINE/INK: CPT

## 2018-08-04 RX ORDER — ONDANSETRON 2 MG/ML
4 INJECTION INTRAMUSCULAR; INTRAVENOUS
Status: DISCONTINUED | OUTPATIENT
Start: 2018-08-04 | End: 2018-08-05 | Stop reason: HOSPADM

## 2018-08-04 RX ORDER — BETAMETHASONE SODIUM PHOSPHATE AND BETAMETHASONE ACETATE 3; 3 MG/ML; MG/ML
12 INJECTION, SUSPENSION INTRA-ARTICULAR; INTRALESIONAL; INTRAMUSCULAR; SOFT TISSUE EVERY 24 HOURS
Status: COMPLETED | OUTPATIENT
Start: 2018-08-04 | End: 2018-08-05

## 2018-08-04 RX ORDER — SODIUM CHLORIDE 0.9 % (FLUSH) 0.9 %
SYRINGE (ML) INJECTION
Status: COMPLETED
Start: 2018-08-04 | End: 2018-08-04

## 2018-08-04 RX ORDER — SODIUM CHLORIDE 0.9 % (FLUSH) 0.9 %
5-10 SYRINGE (ML) INJECTION AS NEEDED
Status: DISCONTINUED | OUTPATIENT
Start: 2018-08-04 | End: 2018-08-05 | Stop reason: HOSPADM

## 2018-08-04 RX ORDER — SODIUM CHLORIDE 0.9 % (FLUSH) 0.9 %
5-10 SYRINGE (ML) INJECTION EVERY 8 HOURS
Status: DISCONTINUED | OUTPATIENT
Start: 2018-08-04 | End: 2018-08-05 | Stop reason: HOSPADM

## 2018-08-04 RX ADMIN — SODIUM CHLORIDE, SODIUM LACTATE, POTASSIUM CHLORIDE, AND CALCIUM CHLORIDE 1000 ML: 600; 310; 30; 20 INJECTION, SOLUTION INTRAVENOUS at 17:40

## 2018-08-04 RX ADMIN — Medication 10 ML: at 19:09

## 2018-08-04 RX ADMIN — BETAMETHASONE SODIUM PHOSPHATE AND BETAMETHASONE ACETATE 12 MG: 3; 3 INJECTION, SUSPENSION INTRA-ARTICULAR; INTRALESIONAL; INTRAMUSCULAR at 17:40

## 2018-08-04 NOTE — H&P
History & Physical 
 
Name: Nerissa Parada MRN: [id number]  SSN: [id number] YOB: 1990  Age: 33 y.o. Sex: female Subjective:  
 
Reason for Admission:  Pregnancy and cramping and bleeding History of Present Illness: Ms. Maddie Worley is a 33 y.o.  female  with an estimated gestational age of 31w6d with Estimated Date of Delivery: 18. Patient presents to Labor & Delivery with c/o intermittent nausea and cramping for approx 4 days. She denies any worsening of symptoms but was just concerned that they were persistent so wanted to come and get checked out. Patient does report some urinary frequency. Pt denies any vomiting and reports she has been able to eat/drink. An FFN was done 18 at her appt which was negative; pt didn't even realize that test was done but reports it must have been secondary to c/o cramping at that time; when asked, she reports the cramping today is similar to what she was experiencing back then. She reports she was checked 2 weeks ago and reports she was closed. She reports good fetal movement and denies any leaking of fluid or vaginal bleeding. She does report a vaginal odor which has been new for her; she reports she switched soaps recently but went back to using what she was previously using. She also reports recent treatment for a yeast infection. She has had issues with heart palpitations which was an issue prior to pregnancy and she has had evaluation by cardiology this pregnancy and reports work-up has been negative. PObHx:  x 1, 38wks (9 months ago) PGynHx: PMHx: heart palpitations; +hepatitis C 
PSHx:tonsillectomy; right ACL repair; ear procedure SocHx: +tob (<1ppd); denies etoh use (although in hospital records, shows h/o etoh abuse); denies drug use Meds: PNV All: NKDA OB History  Para Term  AB Living 1 0 SAB TAB Ectopic Molar Multiple Live Births # Outcome Date GA Lbr Trent/2nd Weight Sex Delivery Anes PTL Lv  
1 Current Past Medical History:  
Diagnosis Date  Alcoholism ([facility] [address]  Drug addiction ([facility] [address]  Hepatitis C   
 Psychiatric disorder  Psychiatric disorder Past Surgical History:  
Procedure Laterality Date  HX ACL RECONSTRUCTION    
 right knee  HX TONSILLECTOMY  INNER EAR SURGERY PROC UNLISTED Social History Occupational History  Not on file. Social History Main Topics  Smoking status: Current Every Day Smoker Packs/day: 0.25  Smokeless tobacco: Never Used  Alcohol use No  
   Comment: No alcohol in 4 months  Drug use: No  
   Comment: \"I haven't used adderal for 17 months\", heroin , cocaine  Sexual activity: Not on file No family history on file. Allergies Allergen Reactions  Tylenol [Acetaminophen] Other (comments) Hepatitis Prior to Admission medications Medication Sig Start Date End Date Taking? Authorizing Provider  
[id number] fum-folic ac-om3 (ONE A DAY WOMEN'S PRENATAL DHA) 28 mg iron- 800 mcg cmpk Take 1 Tab by mouth daily. 3/19/18   Historical Provider  
acetaminophen (TYLENOL EXTRA STRENGTH) 500 mg tablet Take 2 Tabs by mouth every six (6) hours as needed for Pain or Fever. 9/19/13   Brennan Priest MD  
ibuprofen (MOTRIN) 600 mg tablet Take 1 Tab by mouth every six (6) hours as needed for Pain (Fever). 9/19/13   Brennan Priest MD  
fluoxetine (PROZAC) 10 mg capsule Take 10 mg by mouth daily. Dianne Bueno MD  
lithium carbonate (LITHOBID) 600 mg capsule Take 600 mg by mouth daily. 2/26/11   Dianne Bueno MD  
FLUPHENAZINE HCL (PROLIXIN PO) Take  by mouth. 2 tablets at bedtime     Dianne Bueno MD  
ziprasidone (GEODON) 40 mg capsule Take 40 mg by mouth two (2) times daily (with meals). Dianne Bueno MD  
BENZTROPINE MESYLATE (BENZTROPINE PO) Take  by mouth. One tablet in the AM and 2 at bedtime     Dianne Bueno MD  
  
 
Review of Systems: A comprehensive review of systems was negative except for that written in the History of Present Illness. Objective:  
 
Vitals:   
Vitals:  
 18 1644 BP: 121/74 Pulse: (!) 105 Resp: 18 Temp: 98.1 °F (36.7 °C) SpO2: 99% Temp (24hrs), Av.1 °F (36.7 °C), Min:98.1 °F (36.7 °C), Max:98.1 °F (36.7 °C) BP  Min: 121/74  Max: 121/74 Physical Exam: 
Patient without distress. Heart: Regular rate and rhythm Lung: normal respiratory effort Abdomen: soft, gravid, nontender Perineum: blood absent, amniotic fluid absent Cervical Exam: /-2 Lower Extremities: no calf tenderness Membranes:  Intact Uterine Activity:  None Fetal Heart Rate:  Reactive; 140's moderate variability, +accels, no decels SSE: no significant discharge; when cervix swabbed with cotton-tip swab for wet prep, there was some bright red bleeding from the cervix Lab/Data Review: No results found for this or any previous visit (from the past 24 hour(s)). Assessment and Plan: Active Problems: 
  Pregnancy (2018) Pt is a 27yo  @ 32w6d with cramping, likely b-h ctx but given change in cervical dilation since exam 2 weeks ago and bleeding after cervical swab, will observe patient overnight 
-CBC, sample to blood bank 
-GBS swab obtained 
-wet prep obtained 
-will check urinalysis -IVF bolus 
-zofran prn nausea/vomiting 
-will give betamethasone x 2, 1st injection now 
-reactive FHT 
-all questions answered Signed By:  Carito Masters DO 2018

## 2018-08-04 NOTE — IP AVS SNAPSHOT
[address] [place] [address] 
[phone] Patient: Radhames Osorio MRN: [id number] :1990 About your hospitalization You were admitted on:  2018 You last received care in the:  [facility] 3 LABOR & DELIVERY You were discharged on:  2018 Why you were hospitalized Your primary diagnosis was:  Not on File Your diagnoses also included:  Pregnancy Follow-up Information Follow up With Details Comments Contact Info None   None ([phone]) Patient stated that they have no PCP Marcial Ha, [address] [place] [address] 
[address] [place]ja [address] 
[phone] Discharge Orders None A check evangelina indicates which time of day the medication should be taken. My Medications ASK your doctor about these medications Instructions Each Dose to Equal  
 Morning Noon Evening Bedtime ONE A DAY WOMEN'S PRENATAL DHA 28 mg iron- 800 mcg Cmpk Generic drug:  [id number] fum-folic ac-om3 Your last dose was: Your next dose is: Take 1 Tab by mouth daily. 1 Tab Discharge Instructions Lang Ross Contractions: Care Instructions Your Care Instructions Nowata Banerjee contractions prepare your uterus for labor. Think of them as a \"warm-up\" exercise that your body does. You may begin to feel them between the 28th and 30th weeks of your pregnancy. But they start as early as the 20th week. Nowata Banerjee contractions usually occur more often during the ninth month. They may go away when you are active and return when you rest. These contractions are like mild contractions of true labor, but they occur less often. (You feel fewer than 8 in an hour.) They don't cause your cervix to open.  
It may be hard for you to tell the difference between Lang Care contractions and true labor, especially in your first pregnancy. Follow-up care is a key part of your treatment and safety. Be sure to make and go to all appointments, and call your doctor if you are having problems. It's also a good idea to know your test results and keep a list of the medicines you take. How can you care for yourself at home? · Try a warm bath to help relieve muscle tension and reduce pain. · Change positions every 30 minutes. Take breaks if you must sit for a long time. Get up and walk around. · Drink plenty of water, enough so that your urine is light yellow or clear like water. · Taking short walks may help you feel better. Your doctor needs to check any contractions that are getting stronger or closer together. Where can you learn more? Go to http://queta-maxine.info/. Enter 510 [id number] [id number] in the search box to learn more about \"Poinsett Banerjee Contractions: Care Instructions. \" Current as of: 2017 Content Version: 11.7 © 6878-5841 [employer]. Care instructions adapted under license by [employer] (which disclaims liability or warranty for this information). If you have questions about a medical condition or this instruction, always ask your healthcare professional. [place] [address] any warranty or liability for your use of this information.  Labor: Care Instructions Your Care Instructions  labor is the start of labor between 20 and 36 weeks of pregnancy. A full-term pregnancy lasts 37 to 42 weeks. In labor, the uterus contracts to open the cervix. This is the first stage of childbirth.  labor can be caused by a problem with the baby, the mother, or both. Often the cause is not known. In some cases, doctors use medicines to try to delay labor until 29 or more weeks of pregnancy.  By this time, a baby has grown enough so that problems are not likely. In some cases-such as with a serious infection-it is healthier for the baby to be born early. Your treatment will depend on how far along you are in your pregnancy and on your health and your baby's health. Follow-up care is a key part of your treatment and safety. Be sure to make and go to all appointments, and call your doctor if you are having problems. It's also a good idea to know your test results and keep a list of the medicines you take. How can you care for yourself at home? · If your doctor prescribed medicines, take them exactly as directed. Call your doctor if you think you are having a problem with your medicine. · Rest until your doctor advises you about activity. He or she will tell you if you should stay in bed most of the time. You may need to arrange for  if you have young children. · Do not have sexual intercourse unless your doctor says it is safe. · Use pads, not tampons, if you have vaginal bleeding. · Make sure to drink plenty of fluids. Dehydration can lead to contractions. If you have kidney, heart, or liver disease and have to limit fluids, talk with your doctor before you increase the amount of fluids you drink. · Do not smoke or allow others to smoke around you. If you need help quitting, talk to your doctor about stop-smoking programs and medicines. These can increase your chances of quitting for good. When should you call for help? Call 911 anytime you think you may need emergency care. For example, call if: 
  · You passed out (lost consciousness).  
  · You have severe vaginal bleeding.  
  · You have severe pain in your belly or pelvis.  
  · You have had fluid gushing or leaking from your vagina and you know or think the umbilical cord is bulging into your vagina. If this happens, immediately get down on your knees so your rear end (buttocks) is higher than your head. This will decrease the pressure on the cord until help arrives.  Call your doctor now or seek immediate medical care if: 
  · You have signs of preeclampsia, such as: 
¨ Sudden swelling of your face, hands, or feet. ¨ New vision problems (such as dimness or blurring). ¨ A severe headache.  
  · You have any vaginal bleeding.  
  · You have belly pain or cramping.  
  · You have a fever.  
  · You have had regular contractions (with or without pain) for an hour. This means that you have 6 or more within 1 hour after you change your position and drink fluids.  
  · You have a sudden release of fluid from the vagina.  
  · You have low back pain or pelvic pressure that does not go away.  
  · You notice that your baby has stopped moving or is moving much less than normal.  
 Watch closely for changes in your health, and be sure to contact your doctor if you have any problems. Where can you learn more? Go to http://quetaProenza Schouermaxine.info/. Enter Q400 in the search box to learn more about \" Labor: Care Instructions. \" Current as of: 2017 Content Version: 11.7 © 8724-5860 [employer]. Care instructions adapted under license by [employer] (which disclaims liability or warranty for this information). If you have questions about a medical condition or this instruction, always ask your healthcare professional. [address] any warranty or liability for your use of this information. Weeks 34 to 36 of Your Pregnancy: Care Instructions Your Care Instructions By now, your baby and your belly have grown quite large. It is almost time to give birth. A full-term pregnancy can deliver between 37 and 42 weeks. Your baby's lungs are almost ready to breathe air. The bones in your baby's head are now firm enough to protect it, but soft enough to move down through the birth canal. 
You may feel excited, happy, anxious, or scared.  You may wonder how you will know if you are in labor or what to expect during labor. Try to be flexible in your expectations of the birth. Because each birth is different, there is no way to know exactly what childbirth will be like for you. This care sheet will help you know what to expect and how to prepare. This may make your childbirth easier. If you haven't already had the Tdap shot during this pregnancy, talk to your doctor about getting it. It will help protect your  against pertussis infection. In the 36th week, most women have a test for group B streptococcus (GBS). GBS is a common bacteria that can live in the vagina and rectum. It can make your baby sick after birth. If you test positive, you will get antibiotics during labor. The medicine will keep your baby from getting the bacteria. Follow-up care is a key part of your treatment and safety. Be sure to make and go to all appointments, and call your doctor if you are having problems. It's also a good idea to know your test results and keep a list of the medicines you take. How can you care for yourself at home? Learn about pain relief choices · Pain is different for every woman. Talk with your doctor about your feelings about pain. · You can choose from several types of pain relief. These include medicine or breathing techniques, as well as comfort measures. You can use more than one option. · If you choose to have pain medicine during labor, talk to your doctor about your options. Some medicines lower anxiety and help with some of the pain. Others make your lower body numb so that you won't feel pain. · Be sure to tell your doctor about your pain medicine choice before you start labor or very early in your labor. You may be able to change your mind as labor progresses. · Rarely, a woman is put to sleep by medicine given through a mask or an IV. Labor and delivery · The first stage of labor has three parts: early, active, and transition. ¨ Most women have early labor at home. You can stay busy or rest, eat light snacks, drink clear fluids, and start counting contractions. ¨ When talking during a contraction gets hard, you may be moving to active labor. During active labor, you should head for the hospital if you are not there already. ¨ You are in active labor when contractions come every 3 to 4 minutes and last about 60 seconds. Your cervix is opening more rapidly. ¨ If your water breaks, contractions will come faster and stronger. ¨ During transition, your cervix is stretching, and contractions are coming more rapidly. ¨ You may want to push, but your cervix might not be ready. Your doctor will tell you when to push. · The second stage starts when your cervix is completely opened and you are ready to push. ¨ Contractions are very strong to push the baby down the birth canal. 
¨ You will feel the urge to push. You may feel like you need to have a bowel movement. ¨ You may be coached to push with contractions. These contractions will be very strong, but you will not have them as often. You can get a little rest between contractions. ¨ You may be emotional and irritable. You may not be aware of what is going on around you. ¨ One last push, and your baby is born. · The third stage is when a few more contractions push out the placenta. This may take 30 minutes or less. · The fourth stage is the welcome recovery. You may feel overwhelmed with emotions and exhausted but alert. This is a good time to start breastfeeding. Where can you learn more? Go to http://queta-maxine.info/. Enter S087 in the search box to learn more about \"Weeks 34 to 36 of Your Pregnancy: Care Instructions. \" Current as of: November 21, 2017 Content Version: 11.7 © 6122-4335 [employer].  Care instructions adapted under license by [employer] (which disclaims liability or warranty for this information). If you have questions about a medical condition or this instruction, always ask your healthcare professional. [address] any warranty or liability for your use of this information. Introducing [facility] SERVICES! [employer] introduces [employer] patient portal. Now you can access parts of your medical record, email your doctor's office, and request medication refills online. 1. In your internet browser, go to [email or web address] 2. Click on the First Time User? Click Here link in the Sign In box. You will see the New Member Sign Up page. 3. Enter your [employer] Access Code exactly as it appears below. You will not need to use this code after youve completed the sign-up process. If you do not sign up before the expiration date, you must request a new code. · [employer] Access Code: [email or web address] Expires: 8/27/2018  1:21 PM 
 
4. Enter the last four digits of your Social Security Number (xxxx) and Date of Birth (mm/dd/yyyy) as indicated and click Submit. You will be taken to the next sign-up page. 5. Create a [employer] ID. This will be your [employer] login ID and cannot be changed, so think of one that is secure and easy to remember. 6. Create a [employer] password. You can change your password at any time. 7. Enter your Password Reset Question and Answer. This can be used at a later time if you forget your password. 8. Enter your e-mail address. You will receive e-mail notification when new information is available in [address] 9. Click Sign Up. You can now view and download portions of your medical record. 10. Click the Download Summary menu link to download a portable copy of your medical information. If you have questions, please visit the Frequently Asked Questions section of the [employer] website. Remember, [employer] is NOT to be used for urgent needs. For medical emergencies, dial 911. Now available from your iPhone and Android! Introducing Larry Claudio As a [employer] patient, I wanted to make you aware of our electronic visit tool called Larry Claudio. [employer] 24/7 allows you to connect within minutes with a medical provider 24 hours a day, seven days a week via a mobile device or tablet or logging into a secure website from your computer. You can access Larry Claudio from anywhere in the [place]. A virtual visit might be right for you when you have a simple condition and feel like you just dont want to get out of bed, or cant get away from work for an appointment, when your regular [employer] provider is not available (evenings, weekends or holidays), or when youre out of town and need minor care. Electronic visits cost only $49 and if the [employer] 24/7 provider determines a prescription is needed to treat your condition, one can be electronically transmitted to a nearby pharmacy*. Please take a moment to enroll today if you have not already done so. The enrollment process is free and takes just a few minutes. To enroll, please download the [employer] 24/7 mikey to your tablet or phone, or visit [email or web address] to enroll on your computer. And, as an [address] [place] [address] patient with a [employer] account, the results of your visits will be scanned into your electronic medical record and your primary care provider will be able to view the scanned results. We urge you to continue to see your regular [employer] provider for your ongoing medical care. And while your primary care provider may not be the one available when you seek a Larry Claudio virtual visit, the peace of mind you get from getting a real diagnosis real time can be priceless. For more information on Larry Claudio, view our Frequently Asked Questions (FAQs) at [email or web address]. Sincerely, 
 
Vicki Baca MD 
Chief Medical Officer Hannah Mauro *:  certain medications cannot be prescribed via Larry Claudio Unresulted Labs-Please follow up with your PCP about these lab tests Order Current Status CULTURE, GENITAL GROUP B STREP In process Providers Seen During Your Hospitalization Provider Specialty Primary office phone Juan David Hernandez MD Obstetrics & Gynecology [phone] Your Primary Care Physician (PCP) Primary Care Physician Office Phone Office Fax NONE ** None ** ** None ** You are allergic to the following Allergen Reactions Tylenol (Acetaminophen) Other (comments) Hepatitis per pt can take tylenol Recent Documentation Height Weight BMI OB Status Smoking Status 1.626 m 88.9 kg 33.64 kg/m2 Pregnant Current Every Day Smoker Emergency Contacts Name Discharge Info Relation Home Work Mobile Sb Kolb DISCHARGE CAREGIVER [3] Father [15] [phone] Patient Belongings The following personal items are in your possession at time of discharge: 
                             
 
  
  
 Please provide this summary of care documentation to your next provider. Signatures-by signing, you are acknowledging that this After Visit Summary has been reviewed with you and you have received a copy. Patient Signature:  ____________________________________________________________ Date:  ____________________________________________________________  
  
Bryce Simmons Provider Signature:  ____________________________________________________________ Date:  ____________________________________________________________

## 2018-08-04 NOTE — PROGRESS NOTES
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 States having evin frequency however denies any pain with urination, also dates have cramping just below umbilicus and lower back pain. 1711  at bedsid 1725 GBS and wet prep done by dr. Radha Salazar. 1751 to room 3178 
 
1842 pt denies having any uc 
 
185 dr Sherley Recio not available to put in new orders verbal orders taken.  bedside report given to rosanne Woods rn

## 2018-08-04 NOTE — IP AVS SNAPSHOT
[address] [facility] 
[phone] Patient: Shazia Sheriff MRN: [id number] :1990 A check evangelina indicates which time of day the medication should be taken. My Medications ASK your doctor about these medications Instructions Each Dose to Equal  
 Morning Noon Evening Bedtime ONE A DAY WOMEN'S PRENATAL DHA 28 mg iron- 800 mcg Cmpk Generic drug:  [id number] fum-folic ac-om3 Your last dose was: Your next dose is: Take 1 Tab by mouth daily. 1 Tab

## 2018-08-05 VITALS
RESPIRATION RATE: 20 BRPM | BODY MASS INDEX: 33.46 KG/M2 | HEART RATE: 94 BPM | TEMPERATURE: 98.3 F | SYSTOLIC BLOOD PRESSURE: 115 MMHG | HEIGHT: 64 IN | DIASTOLIC BLOOD PRESSURE: 59 MMHG | OXYGEN SATURATION: 99 % | WEIGHT: 196 LBS

## 2018-08-05 PROCEDURE — 74011250636 HC RX REV CODE- 250/636: Performed by: OBSTETRICS & GYNECOLOGY

## 2018-08-05 PROCEDURE — 99285 EMERGENCY DEPT VISIT HI MDM: CPT

## 2018-08-05 PROCEDURE — 96360 HYDRATION IV INFUSION INIT: CPT

## 2018-08-05 PROCEDURE — 96372 THER/PROPH/DIAG INJ SC/IM: CPT

## 2018-08-05 PROCEDURE — 99218 HC RM OBSERVATION: CPT

## 2018-08-05 PROCEDURE — 59025 FETAL NON-STRESS TEST: CPT

## 2018-08-05 RX ADMIN — BETAMETHASONE SODIUM PHOSPHATE AND BETAMETHASONE ACETATE 12 MG: 3; 3 INJECTION, SUSPENSION INTRA-ARTICULAR; INTRALESIONAL; INTRAMUSCULAR at 14:50

## 2018-08-05 NOTE — DISCHARGE INSTRUCTIONS
Chaya Bong Contractions: Care Instructions  Your Care Instructions    Yury Banerjee contractions prepare your uterus for labor. Think of them as a \"warm-up\" exercise that your body does. You may begin to feel them between the 28th and 30th weeks of your pregnancy. But they start as early as the 20th week. [place] Banerjee contractions usually occur more often during the ninth month. They may go away when you are active and return when you rest. These contractions are like mild contractions of true labor, but they occur less often. (You feel fewer than 8 in an hour.) They don't cause your cervix to open. It may be hard for you to tell the difference between Chaya Bong contractions and true labor, especially in your first pregnancy. Follow-up care is a key part of your treatment and safety. Be sure to make and go to all appointments, and call your doctor if you are having problems. It's also a good idea to know your test results and keep a list of the medicines you take. How can you care for yourself at home? · Try a warm bath to help relieve muscle tension and reduce pain. · Change positions every 30 minutes. Take breaks if you must sit for a long time. Get up and walk around. · Drink plenty of water, enough so that your urine is light yellow or clear like water. · Taking short walks may help you feel better. Your doctor needs to check any contractions that are getting stronger or closer together. Where can you learn more? Go to http://queta-maxine.info/. Enter 286 [id number] [id number] in the search box to learn more about \"[place] Banerjee Contractions: Care Instructions. \"  Current as of: November 21, 2017  Content Version: 11.7  © 9754-4749 [employer]. Care instructions adapted under license by [employer] (which disclaims liability or warranty for this information).  If you have questions about a medical condition or this instruction, always ask your healthcare professional. Healthwise, [facility] disclaims any warranty or liability for your use of this information.  Labor: Care Instructions  Your Care Instructions     labor is the start of labor between 21 and 36 weeks of pregnancy. A full-term pregnancy lasts 37 to 42 weeks. In labor, the uterus contracts to open the cervix. This is the first stage of childbirth.  labor can be caused by a problem with the baby, the mother, or both. Often the cause is not known. In some cases, doctors use medicines to try to delay labor until 29 or more weeks of pregnancy. By this time, a baby has grown enough so that problems are not likely. In some cases-such as with a serious infection-it is healthier for the baby to be born early. Your treatment will depend on how far along you are in your pregnancy and on your health and your baby's health. Follow-up care is a key part of your treatment and safety. Be sure to make and go to all appointments, and call your doctor if you are having problems. It's also a good idea to know your test results and keep a list of the medicines you take. How can you care for yourself at home? · If your doctor prescribed medicines, take them exactly as directed. Call your doctor if you think you are having a problem with your medicine. · Rest until your doctor advises you about activity. He or she will tell you if you should stay in bed most of the time. You may need to arrange for  if you have young children. · Do not have sexual intercourse unless your doctor says it is safe. · Use pads, not tampons, if you have vaginal bleeding. · Make sure to drink plenty of fluids. Dehydration can lead to contractions. If you have kidney, heart, or liver disease and have to limit fluids, talk with your doctor before you increase the amount of fluids you drink. · Do not smoke or allow others to smoke around you.  If you need help quitting, talk to your doctor about stop-smoking programs and medicines. These can increase your chances of quitting for good. When should you call for help? Call 911 anytime you think you may need emergency care. For example, call if:    · You passed out (lost consciousness).     · You have severe vaginal bleeding.     · You have severe pain in your belly or pelvis.     · You have had fluid gushing or leaking from your vagina and you know or think the umbilical cord is bulging into your vagina. If this happens, immediately get down on your knees so your rear end (buttocks) is higher than your head. This will decrease the pressure on the cord until help arrives.   [facility] your doctor now or seek immediate medical care if:    · You have signs of preeclampsia, such as:  ¨ Sudden swelling of your face, hands, or feet. ¨ New vision problems (such as dimness or blurring). ¨ A severe headache.     · You have any vaginal bleeding.     · You have belly pain or cramping.     · You have a fever.     · You have had regular contractions (with or without pain) for an hour. This means that you have 6 or more within 1 hour after you change your position and drink fluids.     · You have a sudden release of fluid from the vagina.     · You have low back pain or pelvic pressure that does not go away.     · You notice that your baby has stopped moving or is moving much less than normal.    Watch closely for changes in your health, and be sure to contact your doctor if you have any problems. Where can you learn more? Go to http://queta-maxine.info/. Enter Q400 in the search box to learn more about \" Labor: Care Instructions. \"  Current as of: 2017  Content Version: 11.7  © 6996-8669 [employer]. Care instructions adapted under license by [employer] (which disclaims liability or warranty for this information).  If you have questions about a medical condition or this instruction, always ask your healthcare professional. [employer] disclaims any warranty or liability for your use of this information. Weeks 34 to 36 of Your Pregnancy: Care Instructions  Your Care Instructions    By now, your baby and your belly have grown quite large. It is almost time to give birth. A full-term pregnancy can deliver between 37 and 42 weeks. Your baby's lungs are almost ready to breathe air. The bones in your baby's head are now firm enough to protect it, but soft enough to move down through the birth canal.  You may feel excited, happy, anxious, or scared. You may wonder how you will know if you are in labor or what to expect during labor. Try to be flexible in your expectations of the birth. Because each birth is different, there is no way to know exactly what childbirth will be like for you. This care sheet will help you know what to expect and how to prepare. This may make your childbirth easier. If you haven't already had the Tdap shot during this pregnancy, talk to your doctor about getting it. It will help protect your  against pertussis infection. In the 36th week, most women have a test for group B streptococcus (GBS). GBS is a common bacteria that can live in the vagina and rectum. It can make your baby sick after birth. If you test positive, you will get antibiotics during labor. The medicine will keep your baby from getting the bacteria. Follow-up care is a key part of your treatment and safety. Be sure to make and go to all appointments, and call your doctor if you are having problems. It's also a good idea to know your test results and keep a list of the medicines you take. How can you care for yourself at home? Learn about pain relief choices  · Pain is different for every woman. Talk with your doctor about your feelings about pain. · You can choose from several types of pain relief. These include medicine or breathing techniques, as well as comfort measures. You can use more than one option.   · If you choose to have pain medicine during labor, talk to your doctor about your options. Some medicines lower anxiety and help with some of the pain. Others make your lower body numb so that you won't feel pain. · Be sure to tell your doctor about your pain medicine choice before you start labor or very early in your labor. You may be able to change your mind as labor progresses. · Rarely, a woman is put to sleep by medicine given through a mask or an IV. Labor and delivery  · The first stage of labor has three parts: early, active, and transition. ¨ Most women have early labor at home. You can stay busy or rest, eat light snacks, drink clear fluids, and start counting contractions. ¨ When talking during a contraction gets hard, you may be moving to active labor. During active labor, you should head for the hospital if you are not there already. ¨ You are in active labor when contractions come every 3 to 4 minutes and last about 60 seconds. Your cervix is opening more rapidly. ¨ If your water breaks, contractions will come faster and stronger. ¨ During transition, your cervix is stretching, and contractions are coming more rapidly. ¨ You may want to push, but your cervix might not be ready. Your doctor will tell you when to push. · The second stage starts when your cervix is completely opened and you are ready to push. ¨ Contractions are very strong to push the baby down the birth canal.  ¨ You will feel the urge to push. You may feel like you need to have a bowel movement. ¨ You may be coached to push with contractions. These contractions will be very strong, but you will not have them as often. You can get a little rest between contractions. ¨ You may be emotional and irritable. You may not be aware of what is going on around you. ¨ One last push, and your baby is born. · The third stage is when a few more contractions push out the placenta. This may take 30 minutes or less.   · The fourth stage is the welcome recovery. You may feel overwhelmed with emotions and exhausted but alert. This is a good time to start breastfeeding. Where can you learn more? Go to http://queta-maxine.info/. Enter H831 in the search box to learn more about \"Weeks 34 to 36 of Your Pregnancy: Care Instructions. \"  Current as of: November 21, 2017  Content Version: 11.7  © 8222-7638 [employer] Care instructions adapted under license by [employer] (which disclaims liability or warranty for this information). If you have questions about a medical condition or this instruction, always ask your healthcare professional. [address] any warranty or liability for your use of this information.

## 2018-08-05 NOTE — PROGRESS NOTES
Discharge instructions signed and witnessed, patient verbalized understanding. Patient to call MD office tomorrow to schedule follow up appointment with Dr. Aline Gilman before Wednesday.

## 2018-08-05 NOTE — PROGRESS NOTES
Ante Partum Progress Note    Iliana Brunner  33w0d    Patient states she has no new complaints    Vitals: Patient Vitals for the past 24 hrs:   BP Temp Pulse Resp SpO2 Height Weight   08/04/18 2306 119/60 97.6 °F (36.4 °C) 85 16 - - -   08/04/18 1821 - - - - - 5' 4\" (1.626 m) 88.9 kg (196 lb)   08/04/18 1805 - - - - 99 % - -   08/04/18 1803 117/64 - (!) 105 - - - -   08/04/18 1644 121/74 98.1 °F (36.7 °C) (!) 105 18 99 % - -       Intake and Output:   Current shift:     Last 3 completed shifts:      Non stress test:  Reactive    Uterine Activity: None     Exam:  Patient without distress.      Abdomen, fundus soft non-tender     Extremities, no redness or tenderness               Additional Exam: Patient without distress    Labs:     Lab Results   Component Value Date/Time    WBC 10.6 08/04/2018 05:47 PM    WBC 8.7 04/12/2018 02:08 AM    WBC 7.7 03/07/2018 08:13 PM    HGB 10.9 (L) 08/04/2018 05:47 PM    HGB 11.5 04/12/2018 02:08 AM    HGB 12.3 03/07/2018 08:13 PM    HCT 32.3 (L) 08/04/2018 05:47 PM    HCT 33.5 (L) 04/12/2018 02:08 AM    HCT 37.9 03/07/2018 08:13 PM    PLATELET 794 32/15/8639 05:47 PM    PLATELET 708 02/47/5474 02:08 AM    PLATELET 437 43/56/6819 08:13 PM       Recent Results (from the past 24 hour(s))   WET PREP    Collection Time: 08/04/18  5:47 PM   Result Value Ref Range    Clue cells CLUE CELLS ABSENT      Wet prep NO TRICHOMONAS SEEN     CBC WITH AUTOMATED DIFF    Collection Time: 08/04/18  5:47 PM   Result Value Ref Range    WBC 10.6 3.6 - 11.0 K/uL    RBC 3.49 (L) 3.80 - 5.20 M/uL    HGB 10.9 (L) 11.5 - 16.0 g/dL    HCT 32.3 (L) 35.0 - 47.0 %    MCV 92.6 80.0 - 99.0 FL    MCH 31.2 26.0 - 34.0 PG    MCHC 33.7 30.0 - 36.5 g/dL    RDW 13.5 11.5 - 14.5 %    PLATELET 787 795 - 107 K/uL    MPV 11.0 8.9 - 12.9 FL    NRBC 0.0 0  WBC    ABSOLUTE NRBC 0.00 0.00 - 0.01 K/uL    NEUTROPHILS 71 32 - 75 %    LYMPHOCYTES 20 12 - 49 %    MONOCYTES 6 5 - 13 %    EOSINOPHILS 2 0 - 7 %    BASOPHILS 0 0 - 1 %    IMMATURE GRANULOCYTES 2 (H) 0.0 - 0.5 %    ABS. NEUTROPHILS 7.5 1.8 - 8.0 K/UL    ABS. LYMPHOCYTES 2.1 0.8 - 3.5 K/UL    ABS. MONOCYTES 0.6 0.0 - 1.0 K/UL    ABS. EOSINOPHILS 0.2 0.0 - 0.4 K/UL    ABS. BASOPHILS 0.0 0.0 - 0.1 K/UL    ABS. IMM. GRANS. 0.2 (H) 0.00 - 0.04 K/UL    DF AUTOMATED     URINALYSIS W/ REFLEX CULTURE    Collection Time: 08/04/18  7:07 PM   Result Value Ref Range    Color YELLOW/STRAW      Appearance CLEAR CLEAR      Specific gravity 1.009 1.003 - 1.030      pH (UA) 7.0 5.0 - 8.0      Protein NEGATIVE  NEG mg/dL    Glucose NEGATIVE  NEG mg/dL    Ketone NEGATIVE  NEG mg/dL    Bilirubin NEGATIVE  NEG      Blood MODERATE (A) NEG      Urobilinogen 0.2 0.2 - 1.0 EU/dL    Nitrites NEGATIVE  NEG      Leukocyte Esterase NEGATIVE  NEG      WBC 0-4 0 - 4 /hpf    RBC 0-5 0 - 5 /hpf    Epithelial cells FEW FEW /lpf    Bacteria NEGATIVE  NEG /hpf    UA:UC IF INDICATED CULTURE NOT INDICATED BY UA RESULT CNI         Assessment: 33w0d   Pre-term labor, arrested  S/p BMZ#1  Doing well, asymptomatic    Plan:  Discharge home after BMZ#2 with the following: Activity: bedrest Diet: as tolerated. Follow up in office: this week. Medications: prenatal vitamins.

## 2018-08-05 NOTE — PROGRESS NOTES
[id number] [facility] called out, reported that she has not received her lunch yet and said that she felt 'hot' and wanted to have her temperature checked. T-98.3 oral. Temperature of the room decreased. Kiran Benitez Next she asked to have the fetal monitor on again to make sure she was not having any contractions. EFM applied, FHT's 135, + fetal movements.

## 2018-08-05 NOTE — PROGRESS NOTES
1915 Bedside report received from YESIKA Bill RN and care assumed. Pt resting in bed. Pt denies any needs at this time. 2000 Pt visiting with friends. No needs expressed at this time. 2130 Pt ambulating around room. Pt denies any needs at this time. 2230 pt resting in bed watching TV. Water pitcher refilled with ice and water. 2306 Pt placed on monitor for shift NST. Vital signs and assessment completed for shift. 0100 Pt resting in bed watching Netflix on cell phone. 0300 Pt resting in bed watching TV. Pt denies any needs at this time. 0500 Pt resting in bed in right lateral position with eyes shut respirations audible even and unlabored no distress noted. 0640 Pt resting in bed in right tilt position with eyes shut. Respirations audible even and unlabored no distress noted.

## 2018-08-05 NOTE — PROGRESS NOTES
Discharged home ambulatory in stable condition, patient to call MD office on Monday to get an appointment with Dr. Margarito Weinstein.

## 2018-08-08 LAB
BACTERIA SPEC CULT: NORMAL
SERVICE CMNT-IMP: NORMAL

## 2018-08-18 ENCOUNTER — HOSPITAL ENCOUNTER (EMERGENCY)
Age: 28
Discharge: HOME | End: 2018-08-18
Attending: SPECIALIST | Admitting: OBSTETRICS & GYNECOLOGY
Payer: MEDICAID

## 2018-08-18 VITALS
OXYGEN SATURATION: 98 % | SYSTOLIC BLOOD PRESSURE: 111 MMHG | WEIGHT: 195 LBS | RESPIRATION RATE: 18 BRPM | HEIGHT: 64 IN | HEART RATE: 94 BPM | TEMPERATURE: 98.1 F | DIASTOLIC BLOOD PRESSURE: 66 MMHG | BODY MASS INDEX: 33.29 KG/M2

## 2018-08-18 LAB
APPEARANCE UR: CLEAR
BACTERIA URNS QL MICRO: ABNORMAL /HPF
BILIRUB UR QL: NEGATIVE
COLOR UR: ABNORMAL
EPITH CASTS URNS QL MICRO: ABNORMAL /LPF
GLUCOSE UR STRIP.AUTO-MCNC: NEGATIVE MG/DL
HGB UR QL STRIP: NEGATIVE
HYALINE CASTS URNS QL MICRO: ABNORMAL /LPF (ref 0–5)
KETONES UR QL STRIP.AUTO: NEGATIVE MG/DL
LEUKOCYTE ESTERASE UR QL STRIP.AUTO: NEGATIVE
NITRITE UR QL STRIP.AUTO: NEGATIVE
PH UR STRIP: 6 [PH] (ref 5–8)
PROT UR STRIP-MCNC: NEGATIVE MG/DL
RBC #/AREA URNS HPF: ABNORMAL /HPF (ref 0–5)
SP GR UR REFRACTOMETRY: 1.01 (ref 1–1.03)
UA: UC IF INDICATED,UAUC: ABNORMAL
UROBILINOGEN UR QL STRIP.AUTO: 0.2 EU/DL (ref 0.2–1)
WBC URNS QL MICRO: ABNORMAL /HPF (ref 0–4)

## 2018-08-18 PROCEDURE — 87086 URINE CULTURE/COLONY COUNT: CPT | Performed by: OBSTETRICS & GYNECOLOGY

## 2018-08-18 PROCEDURE — 99285 EMERGENCY DEPT VISIT HI MDM: CPT

## 2018-08-18 PROCEDURE — 81001 URINALYSIS AUTO W/SCOPE: CPT | Performed by: OBSTETRICS & GYNECOLOGY

## 2018-08-18 PROCEDURE — 59025 FETAL NON-STRESS TEST: CPT

## 2018-08-18 NOTE — IP AVS SNAPSHOT
Summary of Care Report The Summary of Care report has been created to help improve care coordination. Users with access to [employer] or [address] (Web-based application) may access additional patient information including the Discharge Summary. If you are not currently a [address] user and need more information, please call the number listed below in the [place] [address] section and ask to be connected with Medical Records. Facility Information Name Address Phone [address] [address] [phone] Patient Information Patient Name Sex CHAMP Johnson Smoker ([id number]) Female 1990 Discharge Information Admitting Provider Service Area Unit Jeff Ruiz MD / [address] [facility] Ld Triage / [phone] Discharge Provider Discharge Date/Time Discharge Disposition Destination (none) 2018 Evening (Pending) AHR (none) Patient Language Language ENGLISH [13] Hospital Problems as of 2018  Reviewed: 2018  3:15 PM by uJdy Dempsey MD  
  
  
  
 Class Noted - Resolved Last Modified POA Active Problems Pregnancy  2018 - Present 2018 by Jeff Ruiz MD Unknown Entered by Kait Murphy DO Non-Hospital Problems as of 2018  Reviewed: 2018  3:15 PM by Judy Dempsey MD  
  
  
  
 Class Noted - Resolved Last Modified Active Problems Heart palpitations  2018 - Present 2018 by Natacha Berger LPN Entered by Natacha Berger LPN You are allergic to the following Allergen Reactions Tylenol (Acetaminophen) Other (comments) Hepatitis per pt can take tylenol Current Discharge Medication List  
  
ASK your doctor about these medications Dose & Instructions Dispensing Information Comments ONE A DAY WOMEN'S PRENATAL DHA 28 mg iron- 800 mcg Cmpk Generic drug:  [id number] fum-folic ac-om3 Dose:  1 Tab Take 1 Tab by mouth daily. Refills:  0 Current Immunizations Name Date Rabies Immune Globulin 2018 Rabies- IM Fibroblast Culture 2018, 2018, 2018, 2018 Follow-up Information Follow up With Details Comments Contact Info None   None ([phone]) Patient stated that they have no PCP Aedjose Barber, [address] [facility] 
[address] [place] [address] 
[phone] Discharge Instructions MyChart Activation Thank you for requesting access to [employer]. Please follow the instructions below to securely access and download your online medical record. [employer] allows you to send messages to your doctor, view your test results, renew your prescriptions, schedule appointments, and more. How Do I Sign Up? 1. In your internet browser, go to [email or web address]. 2. Click on the First Time User? Click Here link in the Sign In box. You will see the New Member Sign Up page. 3. Enter your [employer] Access Code exactly as it appears below. You will not need to use this code after youve completed the sign-up process. If you do not sign up before the expiration date, you must request a new code. [employer] Access Code: [email or web address] Expires: 2018  1:21 PM (This is the date your [employer] access code will ) 4. Enter the last four digits of your Social Security Number (xxxx) and Date of Birth (mm/dd/yyyy) as indicated and click Submit. You will be taken to the next sign-up page. 5. Create a [employer] ID. This will be your [employer] login ID and cannot be changed, so think of one that is secure and easy to remember. 6. Create a [employer] password. You can change your password at any time. 7. Enter your Password Reset Question and Answer.  This can be used at a later time if you forget your password. 8. Enter your e-mail address. You will receive e-mail notification when new information is available in [address] 9. Click Sign Up. You can now view and download portions of your medical record. 10. Click the Download Summary menu link to download a portable copy of your medical information. Additional Information If you have questions, please visit the Frequently Asked Questions section of the [employer] website at [email or web address] Remember, [employer] is NOT to be used for urgent needs. For medical emergencies, dial 911 Weeks 32 to 34 of Your Pregnancy: Care Instructions Your Care Instructions During the last few weeks of your pregnancy, you may have more aches and pains. It's important to rest when you can. Your growing baby is putting more pressure on your bladder. So you may need to urinate more often. Hemorrhoids are also common. These are painful, itchy veins in the rectal area. In the 36th week, most women have a test for group B streptococcus (GBS). GBS is a common bacteria that can live in the vagina and rectum. It can make your baby sick after birth. If you test positive, you will get antibiotics during labor. These will keep your baby from getting the bacteria. You may want to talk with your doctor about banking your baby's umbilical cord blood. This is the blood left in the cord after birth. If you want to save this blood, you must arrange it ahead of time. You can't decide at the last minute. If you haven't already had the Tdap shot during this pregnancy, talk to your doctor about getting it. It will help protect your  against pertussis infection. Follow-up care is a key part of your treatment and safety. Be sure to make and go to all appointments, and call your doctor if you are having problems. It's also a good idea to know your test results and keep a list of the medicines you take. How can you care for yourself at home? Ease hemorrhoids · Get more liquids, fruits, vegetables, and fiber in your diet. This will help keep your stools soft. · Avoid sitting for too long. Lie on your left side several times a day. · Clean yourself with soft, moist toilet paper. Or you can use witch hazel pads or personal hygiene pads. · If you are uncomfortable, try ice packs. Or you can sit in a warm sitz bath. Do these for 20 minutes at a time, as needed. · Use hydrocortisone cream for pain and itching. Two examples are Anusol and Preparation H Hydrocortisone. · Ask your doctor about taking an over-the-counter stool softener. Consider breastfeeding · Experts recommend that women breastfeed for 1 year or longer. Breast milk is the perfect food for babies. · Breast milk is easier for babies to digest than formula. And it is always available, just the right temperature, and free. · Breast milk may help protect your child from some health problems.  babies are less likely than formula-fed babies to: ¨ Get ear infections, colds, diarrhea, and pneumonia. ¨ Be obese or get diabetes later in life. · Women who breastfeed have less bleeding after the birth. Their uteruses also shrink back faster. · Some women who breastfeed lose weight faster. Making milk burns calories. · Breastfeeding can lower your risk of breast cancer, ovarian cancer, and osteoporosis. Decide about circumcision for boys · As you make this decision, it may help to think about your personal, Jew, and family traditions. You get to decide if you will keep your son's penis natural or if he will be circumcised. · If you decide that you would like to have your baby circumcised, talk with your doctor. You can share your concerns about pain. And you can discuss your preferences for anesthesia. Where can you learn more? Go to http://queta-maxine.info/. Enter F057 in the search box to learn more about \"Weeks 32 to 34 of Your Pregnancy: Care Instructions. \" Current as of: 2017 Content Version: 11.7 © 1127-2871 [employer]. Care instructions adapted under license by [employer] (which disclaims liability or warranty for this information). If you have questions about a medical condition or this instruction, always ask your healthcare professional. [place] [address] any warranty or liability for your use of this information. Pregnancy Precautions: Care Instructions Your Care Instructions There is no sure way to prevent labor before your due date ( labor) or to prevent most other pregnancy problems. But there are things you can do to increase your chances of a healthy pregnancy. Go to your appointments, follow your doctor's advice, and take good care of yourself. Eat well, and exercise (if your doctor agrees). And make sure to drink plenty of water. Follow-up care is a key part of your treatment and safety. Be sure to make and go to all appointments, and call your doctor if you are having problems. It's also a good idea to know your test results and keep a list of the medicines you take. How can you care for yourself at home? · Make sure you go to your prenatal appointments. At each visit, your doctor will check your blood pressure. Your doctor will also check to see if you have protein in your urine. High blood pressure and protein in urine are signs of preeclampsia. This condition can be dangerous for you and your baby. · Drink plenty of fluids, enough so that your urine is light yellow or clear like water. Dehydration can cause contractions. If you have kidney, heart, or liver disease and have to limit fluids, talk with your doctor before you increase the amount of fluids you drink. · Tell your doctor right away if you notice any symptoms of an infection, such as: ¨ Burning when you urinate. ¨ A foul-smelling discharge from your vagina. ¨ Vaginal itching. ¨ Unexplained fever. ¨ Unusual pain or soreness in your uterus or lower belly. · Eat a balanced diet. Include plenty of foods that are high in calcium and iron. ¨ Foods high in calcium include milk, cheese, yogurt, almonds, and broccoli. ¨ Foods high in iron include red meat, shellfish, poultry, eggs, beans, raisins, whole-grain bread, and leafy green vegetables. · Do not smoke. If you need help quitting, talk to your doctor about stop-smoking programs and medicines. These can increase your chances of quitting for good. · Do not drink alcohol or use illegal drugs. · Follow your doctor's directions about activity. Your doctor will let you know how much, if any, exercise you can do. · Ask your doctor if you can have sex. If you are at risk for early labor, your doctor may ask you to not have sex. · Take care to prevent falls. During pregnancy, your joints are loose, and your balance is off. Sports such as bicycling, skiing, or in-line skating can increase your risk of falling. And don't ride horses or motorcycles, dive, water ski, scuba dive, or parachute jump while you are pregnant. · Avoid getting very hot. Do not use saunas or hot tubs. Avoid staying out in the sun in hot weather for long periods. Take acetaminophen (Tylenol) to lower a high fever. · Do not take any over-the-counter or herbal medicines or supplements without talking to your doctor or pharmacist first. 
When should you call for help? Call 911 anytime you think you may need emergency care. For example, call if: 
  · You passed out (lost consciousness).  
  · You have severe vaginal bleeding.  
  · You have severe pain in your belly or pelvis.  
  · You have had fluid gushing or leaking from your vagina and you know or think the umbilical cord is bulging into your vagina.  If this happens, immediately get down on your knees so your rear end (buttocks) is higher than your head. This will decrease the pressure on the cord until help arrives.  
[facility] your doctor now or seek immediate medical care if: 
  · You have signs of preeclampsia, such as: 
¨ Sudden swelling of your face, hands, or feet. ¨ New vision problems (such as dimness or blurring). ¨ A severe headache.  
  · You have any vaginal bleeding.  
  · You have belly pain or cramping.  
  · You have a fever.  
  · You have had regular contractions (with or without pain) for an hour. This means that you have 8 or more within 1 hour or 4 or more in 20 minutes after you change your position and drink fluids.  
  · You have a sudden release of fluid from your vagina.  
  · You have low back pain or pelvic pressure that does not go away.  
  · You notice that your baby has stopped moving or is moving much less than normal.  
 Watch closely for changes in your health, and be sure to contact your doctor if you have any problems. Where can you learn more? Go to http://queta-maxine.info/. Enter [id number] in the search box to learn more about \"Pregnancy Precautions: Care Instructions. \" Current as of: November 21, 2017 Content Version: 11.7 © 5825-1674 [employer]. Care instructions adapted under license by [employer] (which disclaims liability or warranty for this information). If you have questions about a medical condition or this instruction, always ask your healthcare professional. [place] [address] any warranty or liability for your use of this information. Chart Review Routing History No Routing History on File

## 2018-08-18 NOTE — H&P
OB Outpatient Visit    Name: Hakan Toney MRN: [id number]  SSN: [id number]    YOB: 1990  Age: 33 y.o. Sex: female      Subjective:     Reason for Admission:  Pregnancy and pressure    History of Present Illness: Hakan Toney is a 33 y.o.  female with an estimated gestational age of 34w7d with Estimated Date of Delivery: 18. Patient complains of pelvic pressure. Denies bleeding, ROM, contractions, NVFSC, UTI sx. Baby active. OB History      Para Term  AB Living    2 1 1  0 1    SAB TAB Ectopic Molar Multiple Live Births         1        Past Medical History:   Diagnosis Date    [facility] ([facility]     Drug addiction ([facility]     Hepatitis C     Psychiatric disorder     Psychiatric disorder      Past Surgical History:   Procedure Laterality Date    HX ACL RECONSTRUCTION      right knee    HX OTHER SURGICAL      HX TONSILLECTOMY      INNER EAR SURGERY PROC UNLISTED       Social History     Occupational History    Not on file. Social History Main Topics    Smoking status: Current Every Day Smoker     Packs/day: 0.25    Smokeless tobacco: Never Used    Alcohol use No      Comment: No alcohol in 4 months    Drug use: No      Comment: \"I haven't used adderal for 17 months\", heroin , cocaine    Sexual activity: Yes     Family History   Problem Relation Age of Onset    Heart Disease Mother        Allergies   Allergen Reactions    Tylenol [Acetaminophen] Other (comments)     Hepatitis per pt can take tylenol       Prior to Admission medications    Medication Sig Start Date End Date Taking? Authorizing Provider   [id number] fum-folic ac-om3 (ONE A DAY WOMEN'S PRENATAL DHA) 28 mg iron- 800 mcg cmpk Take 1 Tab by mouth daily.  3/19/18   Historical Provider        Review of Systems:  General: Denies fever, sweats, chills  CV: Denies CP, palpitations  Resp: Denies SOB, cough  GI: Denies nausea, vomiting, diarrhea  : Denies dysura, abnormal frequency, hematuria  Neuro: Denies HA, visual disturbance    Objective:     Vitals:    Vitals:    18 1936 18 1937   BP: 122/72    Pulse: (!) 104    Resp: 18    Temp: 98.1 °F (36.7 °C)    SpO2: 99%    Weight:  88.5 kg (195 lb)   Height:  5' 4\" (1.626 m)      Temp (24hrs), Av.1 °F (36.7 °C), Min:98.1 °F (36.7 °C), Max:98.1 °F (36.7 °C)    BP  Min: 122/72  Max: 122/72     Physical Exam  General: in NAD  Back: no CVAT  Abdomen: Gravid, soft, nontender, no abnormal masses, rebound, rigidity, guarding  Fundus: soft, nontender  Cervical Exam: FT/60/-1  Uterine Activity: no contractions detected  Membranes: no gross evidence of ROM  Fetal Heart Rate: adequate variability and reactivity       Recent Results (from the past 12 hour(s))   URINALYSIS W/ REFLEX CULTURE    Collection Time: 18  7:40 PM   Result Value Ref Range    Color YELLOW/STRAW      Appearance CLEAR CLEAR      Specific gravity 1.014 1.003 - 1.030      pH (UA) 6.0 5.0 - 8.0      Protein NEGATIVE  NEG mg/dL    Glucose NEGATIVE  NEG mg/dL    Ketone NEGATIVE  NEG mg/dL    Bilirubin NEGATIVE  NEG      Blood NEGATIVE  NEG      Urobilinogen 0.2 0.2 - 1.0 EU/dL    Nitrites NEGATIVE  NEG      Leukocyte Esterase NEGATIVE  NEG      WBC 0-4 0 - 4 /hpf    RBC 0-5 0 - 5 /hpf    Epithelial cells FEW FEW /lpf    Bacteria 1+ (A) NEG /hpf    UA:UC IF INDICATED URINE CULTURE ORDERED (A) CNI      Hyaline cast 2-5 0 - 5 /lpf       Patient Active Problem List   Diagnosis Code    Heart palpitations R00.2    Pregnancy Z34.90     Assessment and Plan:     Mild dehydration--discussed fluids, precautions  Questions answered. Follow-up in office as scheduled.     Signed By:  Gurperet Dawkins MD     2018

## 2018-08-18 NOTE — IP AVS SNAPSHOT
[address] [facility] 
[phone] Patient: Juany Lyles MRN: [id number] :1990 A check evangelina indicates which time of day the medication should be taken. My Medications ASK your doctor about these medications Instructions Each Dose to Equal  
 Morning Noon Evening Bedtime ONE A DAY WOMEN'S PRENATAL DHA 28 mg iron- 800 mcg Cmpk Generic drug:  [id number] fum-folic ac-om3 Your last dose was: Your next dose is: Take 1 Tab by mouth daily. 1 Tab

## 2018-08-18 NOTE — PROGRESS NOTES
- pt arrived to l&D triage with c/o of pressure / \"feeling like I have to poop\". 34 yo  pt of Dr. Carlos Brannon at 29 6/7. Denies bleeding or LOF. Wants to be checked for LOF because her first pregnancy she had a slow leak. Reports positive FM.       - Dr. Keli Qiu at bedside for evaluation. Cervical exam.  FT/60/ -1    - urinalysis sent, pt propped on right side, given juice     - pt informed of urinalysis results. Reviewed discharge instructions. Signed.  - pt dressed. Copy of AVS given.   Pt ambulated off unit in no apparent distress

## 2018-08-18 NOTE — IP AVS SNAPSHOT
[address] [place] [address] 
[phone] Patient: Eugenio Ramos MRN: [id number] :1990 About your hospitalization You were admitted on:  N/A You last received care in the:  MR[facility] 3 LD TRIAGE You were discharged on:  2018 Why you were hospitalized Your primary diagnosis was:  Not on File Your diagnoses also included:  Pregnancy Follow-up Information Follow up With Details Comments Contact Info None   None ([phone]) Patient stated that they have no PCP Candida Head, [address] [facility] 
[address] [facility] [address] 
[phone] Discharge Orders None A check evangelina indicates which time of day the medication should be taken. My Medications ASK your doctor about these medications Instructions Each Dose to Equal  
 Morning Noon Evening Bedtime ONE A DAY WOMEN'S PRENATAL DHA 28 mg iron- 800 mcg Cmpk Generic drug:  [id number] fum-folic ac-om3 Your last dose was: Your next dose is: Take 1 Tab by mouth daily. 1 Tab Discharge Instructions Buru Buru Activation Thank you for requesting access to Buru Buru. Please follow the instructions below to securely access and download your online medical record. Buru Buru allows you to send messages to your doctor, view your test results, renew your prescriptions, schedule appointments, and more. How Do I Sign Up? 1. In your internet browser, go to [email or web address]. 2. Click on the First Time User? Click Here link in the Sign In box. You will see the New Member Sign Up page. 3. Enter your Buru Buru Access Code exactly as it appears below. You will not need to use this code after youve completed the sign-up process. If you do not sign up before the expiration date, you must request a new code. Velocix Access Code: [email or web address] Expires: 2018  1:21 PM (This is the date your Velocix access code will ) 4. Enter the last four digits of your Social Security Number (xxxx) and Date of Birth (mm/dd/yyyy) as indicated and click Submit. You will be taken to the next sign-up page. 5. Create a Velocix ID. This will be your Velocix login ID and cannot be changed, so think of one that is secure and easy to remember. 6. Create a Velocix password. You can change your password at any time. 7. Enter your Password Reset Question and Answer. This can be used at a later time if you forget your password. 8. Enter your e-mail address. You will receive e-mail notification when new information is available in [address] 9. Click Sign Up. You can now view and download portions of your medical record. 10. Click the Download Summary menu link to download a portable copy of your medical information. Additional Information If you have questions, please visit the Frequently Asked Questions section of the Velocix website at [email or web address] Remember, Velocix is NOT to be used for urgent needs. For medical emergencies, dial 911 Weeks 32 to 34 of Your Pregnancy: Care Instructions Your Care Instructions During the last few weeks of your pregnancy, you may have more aches and pains. It's important to rest when you can. Your growing baby is putting more pressure on your bladder. So you may need to urinate more often. Hemorrhoids are also common. These are painful, itchy veins in the rectal area. In the 36th week, most women have a test for group B streptococcus (GBS). GBS is a common bacteria that can live in the vagina and rectum. It can make your baby sick after birth. If you test positive, you will get antibiotics during labor. These will keep your baby from getting the bacteria.  
You may want to talk with your doctor about banking your baby's umbilical cord blood. This is the blood left in the cord after birth. If you want to save this blood, you must arrange it ahead of time. You can't decide at the last minute. If you haven't already had the Tdap shot during this pregnancy, talk to your doctor about getting it. It will help protect your  against pertussis infection. Follow-up care is a key part of your treatment and safety. Be sure to make and go to all appointments, and call your doctor if you are having problems. It's also a good idea to know your test results and keep a list of the medicines you take. How can you care for yourself at home? Ease hemorrhoids · Get more liquids, fruits, vegetables, and fiber in your diet. This will help keep your stools soft. · Avoid sitting for too long. Lie on your left side several times a day. · Clean yourself with soft, moist toilet paper. Or you can use witch hazel pads or personal hygiene pads. · If you are uncomfortable, try ice packs. Or you can sit in a warm sitz bath. Do these for 20 minutes at a time, as needed. · Use hydrocortisone cream for pain and itching. Two examples are Anusol and Preparation H Hydrocortisone. · Ask your doctor about taking an over-the-counter stool softener. Consider breastfeeding · Experts recommend that women breastfeed for 1 year or longer. Breast milk is the perfect food for babies. · Breast milk is easier for babies to digest than formula. And it is always available, just the right temperature, and free. · Breast milk may help protect your child from some health problems.  babies are less likely than formula-fed babies to: ¨ Get ear infections, colds, diarrhea, and pneumonia. ¨ Be obese or get diabetes later in life. · Women who breastfeed have less bleeding after the birth. Their uteruses also shrink back faster. · Some women who breastfeed lose weight faster. Making milk burns calories. · Breastfeeding can lower your risk of breast cancer, ovarian cancer, and osteoporosis. Decide about circumcision for boys · As you make this decision, it may help to think about your personal, Hindu, and family traditions. You get to decide if you will keep your son's penis natural or if he will be circumcised. · If you decide that you would like to have your baby circumcised, talk with your doctor. You can share your concerns about pain. And you can discuss your preferences for anesthesia. Where can you learn more? Go to http://queta-maxine.info/. Enter O661 in the search box to learn more about \"Weeks 32 to 34 of Your Pregnancy: Care Instructions. \" Current as of: 2017 Content Version: 11.7 © 3921-5642 [employer]. Care instructions adapted under license by [employer] (which disclaims liability or warranty for this information). If you have questions about a medical condition or this instruction, always ask your healthcare professional. [place] [address] any warranty or liability for your use of this information. Pregnancy Precautions: Care Instructions Your Care Instructions There is no sure way to prevent labor before your due date ( labor) or to prevent most other pregnancy problems. But there are things you can do to increase your chances of a healthy pregnancy. Go to your appointments, follow your doctor's advice, and take good care of yourself. Eat well, and exercise (if your doctor agrees). And make sure to drink plenty of water. Follow-up care is a key part of your treatment and safety. Be sure to make and go to all appointments, and call your doctor if you are having problems. It's also a good idea to know your test results and keep a list of the medicines you take. How can you care for yourself at home? · Make sure you go to your prenatal appointments.  At each visit, your doctor will check your blood pressure. Your doctor will also check to see if you have protein in your urine. High blood pressure and protein in urine are signs of preeclampsia. This condition can be dangerous for you and your baby. · Drink plenty of fluids, enough so that your urine is light yellow or clear like water. Dehydration can cause contractions. If you have kidney, heart, or liver disease and have to limit fluids, talk with your doctor before you increase the amount of fluids you drink. · Tell your doctor right away if you notice any symptoms of an infection, such as: ¨ Burning when you urinate. ¨ A foul-smelling discharge from your vagina. ¨ Vaginal itching. ¨ Unexplained fever. ¨ Unusual pain or soreness in your uterus or lower belly. · Eat a balanced diet. Include plenty of foods that are high in calcium and iron. ¨ Foods high in calcium include milk, cheese, yogurt, almonds, and broccoli. ¨ Foods high in iron include red meat, shellfish, poultry, eggs, beans, raisins, whole-grain bread, and leafy green vegetables. · Do not smoke. If you need help quitting, talk to your doctor about stop-smoking programs and medicines. These can increase your chances of quitting for good. · Do not drink alcohol or use illegal drugs. · Follow your doctor's directions about activity. Your doctor will let you know how much, if any, exercise you can do. · Ask your doctor if you can have sex. If you are at risk for early labor, your doctor may ask you to not have sex. · Take care to prevent falls. During pregnancy, your joints are loose, and your balance is off. Sports such as bicycling, skiing, or in-line skating can increase your risk of falling. And don't ride horses or motorcycles, dive, water ski, scuba dive, or parachute jump while you are pregnant. · Avoid getting very hot. Do not use saunas or hot tubs. Avoid staying out in the sun in hot weather for long periods.  Take acetaminophen (Tylenol) to lower a high fever. · Do not take any over-the-counter or herbal medicines or supplements without talking to your doctor or pharmacist first. 
When should you call for help? Call 911 anytime you think you may need emergency care. For example, call if: 
  · You passed out (lost consciousness).  
  · You have severe vaginal bleeding.  
  · You have severe pain in your belly or pelvis.  
  · You have had fluid gushing or leaking from your vagina and you know or think the umbilical cord is bulging into your vagina. If this happens, immediately get down on your knees so your rear end (buttocks) is higher than your head. This will decrease the pressure on the cord until help arrives.  
[facility] your doctor now or seek immediate medical care if: 
  · You have signs of preeclampsia, such as: 
¨ Sudden swelling of your face, hands, or feet. ¨ New vision problems (such as dimness or blurring). ¨ A severe headache.  
  · You have any vaginal bleeding.  
  · You have belly pain or cramping.  
  · You have a fever.  
  · You have had regular contractions (with or without pain) for an hour. This means that you have 8 or more within 1 hour or 4 or more in 20 minutes after you change your position and drink fluids.  
  · You have a sudden release of fluid from your vagina.  
  · You have low back pain or pelvic pressure that does not go away.  
  · You notice that your baby has stopped moving or is moving much less than normal.  
 Watch closely for changes in your health, and be sure to contact your doctor if you have any problems. Where can you learn more? Go to http://queta-maxine.info/. Enter [id number] in the search box to learn more about \"Pregnancy Precautions: Care Instructions. \" Current as of: November 21, 2017 Content Version: 11.7 © 8499-9106 [employer], [employer].  Care instructions adapted under license by [employer] (which disclaims liability or warranty for this information). If you have questions about a medical condition or this instruction, always ask your healthcare professional. [address] any warranty or liability for your use of this information. Introducing [facility] SERVICES! [employer] introduces Movable patient portal. Now you can access parts of your medical record, email your doctor's office, and request medication refills online. 1. In your internet browser, go to [email or web address] 2. Click on the First Time User? Click Here link in the Sign In box. You will see the New Member Sign Up page. 3. Enter your Movable Access Code exactly as it appears below. You will not need to use this code after youve completed the sign-up process. If you do not sign up before the expiration date, you must request a new code. · Movable Access Code: [email or web address] Expires: 8/27/2018  1:21 PM 
 
4. Enter the last four digits of your Social Security Number (xxxx) and Date of Birth (mm/dd/yyyy) as indicated and click Submit. You will be taken to the next sign-up page. 5. Create a Movable ID. This will be your Movable login ID and cannot be changed, so think of one that is secure and easy to remember. 6. Create a Movable password. You can change your password at any time. 7. Enter your Password Reset Question and Answer. This can be used at a later time if you forget your password. 8. Enter your e-mail address. You will receive e-mail notification when new information is available in [address] 9. Click Sign Up. You can now view and download portions of your medical record. 10. Click the Download Summary menu link to download a portable copy of your medical information. If you have questions, please visit the Frequently Asked Questions section of the Movable website. Remember, Movable is NOT to be used for urgent needs. For medical emergencies, dial 911. Now available from your iPhone and Android! Introducing Larry Claudio As a [employer] patient, I wanted to make you aware of our electronic visit tool called Larry Claudio. VitaSensis allows you to connect within minutes with a medical provider 24 hours a day, seven days a week via a mobile device or tablet or logging into a secure website from your computer. You can access Larry Claudio from anywhere in the [place]. A virtual visit might be right for you when you have a simple condition and feel like you just dont want to get out of bed, or cant get away from work for an appointment, when your regular [employer] provider is not available (evenings, weekends or holidays), or when youre out of town and need minor care. Electronic visits cost only $49 and if the VitaSensis provider determines a prescription is needed to treat your condition, one can be electronically transmitted to a nearby pharmacy*. Please take a moment to enroll today if you have not already done so. The enrollment process is free and takes just a few minutes. To enroll, please download the VitaSensis mikey to your tablet or phone, or visit [email or web address] to enroll on your computer. And, as an [address] [place] [address] patient with a GoChime account, the results of your visits will be scanned into your electronic medical record and your primary care provider will be able to view the scanned results. We urge you to continue to see your regular [employer] provider for your ongoing medical care. And while your primary care provider may not be the one available when you seek a Larry Claudio virtual visit, the peace of mind you get from getting a real diagnosis real time can be priceless. For more information on Larry Claudio, view our Frequently Asked Questions (FAQs) at [email or web address]. Sincerely, 
 
Catherine Danielle MD 
Chief Medical Officer Hannah Mauro *:  certain medications cannot be prescribed via Larry Claudio Unresulted Labs-Please follow up with your PCP about these lab tests Order Current Status CULTURE, URINE In process Providers Seen During Your Hospitalization Provider Specialty Primary office phone Leticia Enriquez MD Obstetrics & Gynecology [phone] Your Primary Care Physician (PCP) Primary Care Physician Office Phone Office Fax NONE ** None ** ** None ** You are allergic to the following Allergen Reactions Tylenol (Acetaminophen) Other (comments) Hepatitis per pt can take tylenol Recent Documentation Height Weight BMI OB Status Smoking Status 1.626 m 88.5 kg 33.47 kg/m2 Pregnant Current Every Day Smoker Emergency Contacts Name Discharge Info Relation Home Work Mobile S[employer] DISCHARGE CAREGIVER [3] Father [15] [phone] Patient Belongings The following personal items are in your possession at time of discharge: 
                             
 
  
  
 Please provide this summary of care documentation to your next provider. Signatures-by signing, you are acknowledging that this After Visit Summary has been reviewed with you and you have received a copy. Patient Signature:  ____________________________________________________________ Date:  ____________________________________________________________  
  
Anabelle Oden Provider Signature:  ____________________________________________________________ Date:  ____________________________________________________________

## 2018-08-19 NOTE — DISCHARGE INSTRUCTIONS
O&P Pro Activation    Thank you for requesting access to O&P Pro. Please follow the instructions below to securely access and download your online medical record. O&P Pro allows you to send messages to your doctor, view your test results, renew your prescriptions, schedule appointments, and more. How Do I Sign Up? 1. In your internet browser, go to [email or web address]. 2. Click on the First Time User? Click Here link in the Sign In box. You will see the New Member Sign Up page. 3. Enter your O&P Pro Access Code exactly as it appears below. You will not need to use this code after youve completed the sign-up process. If you do not sign up before the expiration date, you must request a new code. O&P Pro Access Code: [email or web address]  Expires: 2018  1:21 PM (This is the date your O&P Pro access code will )    4. Enter the last four digits of your Social Security Number (xxxx) and Date of Birth (mm/dd/yyyy) as indicated and click Submit. You will be taken to the next sign-up page. 5. Create a O&P Pro ID. This will be your O&P Pro login ID and cannot be changed, so think of one that is secure and easy to remember. 6. Create a O&P Pro password. You can change your password at any time. 7. Enter your Password Reset Question and Answer. This can be used at a later time if you forget your password. 8. Enter your e-mail address. You will receive e-mail notification when new information is available in [address] 9. Click Sign Up. You can now view and download portions of your medical record. 10. Click the Download Summary menu link to download a portable copy of your medical information. Additional Information    If you have questions, please visit the Frequently Asked Questions section of the O&P Pro website at [email or web address] Remember, O&P Pro is NOT to be used for urgent needs.  For medical emergencies, dial 911     Weeks 32 to 34 of Your Pregnancy: Care Instructions  Your Care Instructions    During the last few weeks of your pregnancy, you may have more aches and pains. It's important to rest when you can. Your growing baby is putting more pressure on your bladder. So you may need to urinate more often. Hemorrhoids are also common. These are painful, itchy veins in the rectal area. In the 36th week, most women have a test for group B streptococcus (GBS). GBS is a common bacteria that can live in the vagina and rectum. It can make your baby sick after birth. If you test positive, you will get antibiotics during labor. These will keep your baby from getting the bacteria. You may want to talk with your doctor about banking your baby's umbilical cord blood. This is the blood left in the cord after birth. If you want to save this blood, you must arrange it ahead of time. You can't decide at the last minute. If you haven't already had the Tdap shot during this pregnancy, talk to your doctor about getting it. It will help protect your  against pertussis infection. Follow-up care is a key part of your treatment and safety. Be sure to make and go to all appointments, and call your doctor if you are having problems. It's also a good idea to know your test results and keep a list of the medicines you take. How can you care for yourself at home? Ease hemorrhoids  · Get more liquids, fruits, vegetables, and fiber in your diet. This will help keep your stools soft. · Avoid sitting for too long. Lie on your left side several times a day. · Clean yourself with soft, moist toilet paper. Or you can use witch hazel pads or personal hygiene pads. · If you are uncomfortable, try ice packs. Or you can sit in a warm sitz bath. Do these for 20 minutes at a time, as needed. · Use hydrocortisone cream for pain and itching. Two examples are Anusol and Preparation H Hydrocortisone. · Ask your doctor about taking an over-the-counter stool softener.   Consider breastfeeding  · Experts recommend that women breastfeed for 1 year or longer. Breast milk is the perfect food for babies. · Breast milk is easier for babies to digest than formula. And it is always available, just the right temperature, and free. · Breast milk may help protect your child from some health problems.  babies are less likely than formula-fed babies to:  ¨ Get ear infections, colds, diarrhea, and pneumonia. ¨ Be obese or get diabetes later in life. · Women who breastfeed have less bleeding after the birth. Their uteruses also shrink back faster. · Some women who breastfeed lose weight faster. Making milk burns calories. · Breastfeeding can lower your risk of breast cancer, ovarian cancer, and osteoporosis. Decide about circumcision for boys  · As you make this decision, it may help to think about your personal, Mosque, and family traditions. You get to decide if you will keep your son's penis natural or if he will be circumcised. · If you decide that you would like to have your baby circumcised, talk with your doctor. You can share your concerns about pain. And you can discuss your preferences for anesthesia. Where can you learn more? Go to http://queta-maxine.info/. Enter M825 in the search box to learn more about \"Weeks 32 to 34 of Your Pregnancy: Care Instructions. \"  Current as of: 2017  Content Version: 11.7  © 7090-1229 Healthwise, Incorporated. Care instructions adapted under license by [employer]abbi (which disclaims liability or warranty for this information). If you have questions about a medical condition or this instruction, always ask your healthcare professional. [place] [address] any warranty or liability for your use of this information.        Pregnancy Precautions: Care Instructions  Your Care Instructions    There is no sure way to prevent labor before your due date ( labor) or to prevent most other pregnancy problems. But there are things you can do to increase your chances of a healthy pregnancy. Go to your appointments, follow your doctor's advice, and take good care of yourself. Eat well, and exercise (if your doctor agrees). And make sure to drink plenty of water. Follow-up care is a key part of your treatment and safety. Be sure to make and go to all appointments, and call your doctor if you are having problems. It's also a good idea to know your test results and keep a list of the medicines you take. How can you care for yourself at home? · Make sure you go to your prenatal appointments. At each visit, your doctor will check your blood pressure. Your doctor will also check to see if you have protein in your urine. High blood pressure and protein in urine are signs of preeclampsia. This condition can be dangerous for you and your baby. · Drink plenty of fluids, enough so that your urine is light yellow or clear like water. Dehydration can cause contractions. If you have kidney, heart, or liver disease and have to limit fluids, talk with your doctor before you increase the amount of fluids you drink. · Tell your doctor right away if you notice any symptoms of an infection, such as:  ¨ Burning when you urinate. ¨ A foul-smelling discharge from your vagina. ¨ Vaginal itching. ¨ Unexplained fever. ¨ Unusual pain or soreness in your uterus or lower belly. · Eat a balanced diet. Include plenty of foods that are high in calcium and iron. ¨ Foods high in calcium include milk, cheese, yogurt, almonds, and broccoli. ¨ Foods high in iron include red meat, shellfish, poultry, eggs, beans, raisins, whole-grain bread, and leafy green vegetables. · Do not smoke. If you need help quitting, talk to your doctor about stop-smoking programs and medicines. These can increase your chances of quitting for good. · Do not drink alcohol or use illegal drugs. · Follow your doctor's directions about activity.  Your doctor will let you know how much, if any, exercise you can do. · Ask your doctor if you can have sex. If you are at risk for early labor, your doctor may ask you to not have sex. · Take care to prevent falls. During pregnancy, your joints are loose, and your balance is off. Sports such as bicycling, skiing, or in-line skating can increase your risk of falling. And don't ride horses or motorcycles, dive, water ski, scuba dive, or parachute jump while you are pregnant. · Avoid getting very hot. Do not use saunas or hot tubs. Avoid staying out in the sun in hot weather for long periods. Take acetaminophen (Tylenol) to lower a high fever. · Do not take any over-the-counter or herbal medicines or supplements without talking to your doctor or pharmacist first.  When should you call for help? Call 911 anytime you think you may need emergency care. For example, call if:    · You passed out (lost consciousness).     · You have severe vaginal bleeding.     · You have severe pain in your belly or pelvis.     · You have had fluid gushing or leaking from your vagina and you know or think the umbilical cord is bulging into your vagina. If this happens, immediately get down on your knees so your rear end (buttocks) is higher than your head. This will decrease the pressure on the cord until help arrives.   [facility] your doctor now or seek immediate medical care if:    · You have signs of preeclampsia, such as:  ¨ Sudden swelling of your face, hands, or feet. ¨ New vision problems (such as dimness or blurring). ¨ A severe headache.     · You have any vaginal bleeding.     · You have belly pain or cramping.     · You have a fever.     · You have had regular contractions (with or without pain) for an hour.  This means that you have 8 or more within 1 hour or 4 or more in 20 minutes after you change your position and drink fluids.     · You have a sudden release of fluid from your vagina.     · You have low back pain or pelvic pressure that does not go away.     · You notice that your baby has stopped moving or is moving much less than normal.    Watch closely for changes in your health, and be sure to contact your doctor if you have any problems. Where can you learn more? Go to http://queta-maxine.info/. Enter [id number] in the search box to learn more about \"Pregnancy Precautions: Care Instructions. \"  Current as of: November 21, 2017  Content Version: 11.7  © 1588-3506 Targeted Technologies. Care instructions adapted under license by [employer] (which disclaims liability or warranty for this information). If you have questions about a medical condition or this instruction, always ask your healthcare professional. [address] any warranty or liability for your use of this information.

## 2018-08-20 LAB
BACTERIA SPEC CULT: NORMAL
CC UR VC: NORMAL
SERVICE CMNT-IMP: NORMAL

## 2018-08-25 ENCOUNTER — HOSPITAL ENCOUNTER (EMERGENCY)
Age: 28
Discharge: HOME | End: 2018-08-25
Attending: SPECIALIST | Admitting: OBSTETRICS & GYNECOLOGY
Payer: MEDICAID

## 2018-08-25 VITALS
DIASTOLIC BLOOD PRESSURE: 80 MMHG | BODY MASS INDEX: 33.46 KG/M2 | HEIGHT: 64 IN | OXYGEN SATURATION: 98 % | WEIGHT: 196 LBS | SYSTOLIC BLOOD PRESSURE: 124 MMHG | RESPIRATION RATE: 18 BRPM | HEART RATE: 105 BPM | TEMPERATURE: 98 F

## 2018-08-25 LAB
A1 MICROGLOB PLACENTAL VAG QL: NEGATIVE
CONTROL LINE PRESENT?: NORMAL
DAILY QC (YES/NO)?: YES
EXPIRATION DATE: NORMAL
INTERNAL NEGATIVE CONTROL: NORMAL
KIT LOT NO.: NORMAL
PH, VAGINAL FLUID: 5 (ref 5–6.1)

## 2018-08-25 PROCEDURE — 83986 ASSAY PH BODY FLUID NOS: CPT | Performed by: OBSTETRICS & GYNECOLOGY

## 2018-08-25 PROCEDURE — 59025 FETAL NON-STRESS TEST: CPT

## 2018-08-25 PROCEDURE — 99283 EMERGENCY DEPT VISIT LOW MDM: CPT

## 2018-08-25 PROCEDURE — 84112 EVAL AMNIOTIC FLUID PROTEIN: CPT | Performed by: OBSTETRICS & GYNECOLOGY

## 2018-08-25 NOTE — IP AVS SNAPSHOT
[address] [facility] [address] [facility] 
[phone] Patient: Radu Dumont MRN: [id number] :1990 About your hospitalization You were admitted on:  N/A You last received care in the:  MRM 3 LD TRIAGE You were discharged on:  2018 Why you were hospitalized Your primary diagnosis was:  Not on File Follow-up Information Follow up With Details Comments Contact Info Natty Hanna MD Go on 2018  [address] [facility] [address] [facility] 
[phone] Discharge Orders None A check evangelina indicates which time of day the medication should be taken. My Medications ASK your doctor about these medications Instructions Each Dose to Equal  
 Morning Noon Evening Bedtime ONE A DAY WOMEN'S PRENATAL DHA 28 mg iron- 800 mcg Cmpk Generic drug:  [id number] fum-folic ac-om3 Your last dose was: Your next dose is: Take 1 Tab by mouth daily. 1 Tab Discharge Instructions Weeks 34 to 36 of Your Pregnancy: Care Instructions Your Care Instructions By now, your baby and your belly have grown quite large. It is almost time to give birth. A full-term pregnancy can deliver between 37 and 42 weeks. Your baby's lungs are almost ready to breathe air. The bones in your baby's head are now firm enough to protect it, but soft enough to move down through the birth canal. 
You may feel excited, happy, anxious, or scared. You may wonder how you will know if you are in labor or what to expect during labor. Try to be flexible in your expectations of the birth. Because each birth is different, there is no way to know exactly what childbirth will be like for you. This care sheet will help you know what to expect and how to prepare. This may make your childbirth easier. If you haven't already had the Tdap shot during this pregnancy, talk to your doctor about getting it. It will help protect your  against pertussis infection. In the 36th week, most women have a test for group B streptococcus (GBS). GBS is a common bacteria that can live in the vagina and rectum. It can make your baby sick after birth. If you test positive, you will get antibiotics during labor. The medicine will keep your baby from getting the bacteria. Follow-up care is a key part of your treatment and safety. Be sure to make and go to all appointments, and call your doctor if you are having problems. It's also a good idea to know your test results and keep a list of the medicines you take. How can you care for yourself at home? Learn about pain relief choices · Pain is different for every woman. Talk with your doctor about your feelings about pain. · You can choose from several types of pain relief. These include medicine or breathing techniques, as well as comfort measures. You can use more than one option. · If you choose to have pain medicine during labor, talk to your doctor about your options. Some medicines lower anxiety and help with some of the pain. Others make your lower body numb so that you won't feel pain. · Be sure to tell your doctor about your pain medicine choice before you start labor or very early in your labor. You may be able to change your mind as labor progresses. · Rarely, a woman is put to sleep by medicine given through a mask or an IV. Labor and delivery · The first stage of labor has three parts: early, active, and transition. ¨ Most women have early labor at home. You can stay busy or rest, eat light snacks, drink clear fluids, and start counting contractions. ¨ When talking during a contraction gets hard, you may be moving to active labor. During active labor, you should head for the hospital if you are not there already. ¨ You are in active labor when contractions come every 3 to 4 minutes and last about 60 seconds. Your cervix is opening more rapidly. ¨ If your water breaks, contractions will come faster and stronger. ¨ During transition, your cervix is stretching, and contractions are coming more rapidly. ¨ You may want to push, but your cervix might not be ready. Your doctor will tell you when to push. · The second stage starts when your cervix is completely opened and you are ready to push. ¨ Contractions are very strong to push the baby down the birth canal. 
¨ You will feel the urge to push. You may feel like you need to have a bowel movement. ¨ You may be coached to push with contractions. These contractions will be very strong, but you will not have them as often. You can get a little rest between contractions. ¨ You may be emotional and irritable. You may not be aware of what is going on around you. ¨ One last push, and your baby is born. · The third stage is when a few more contractions push out the placenta. This may take 30 minutes or less. · The fourth stage is the welcome recovery. You may feel overwhelmed with emotions and exhausted but alert. This is a good time to start breastfeeding. Where can you learn more? Go to http://queta-maxine.info/. Enter K381 in the search box to learn more about \"Weeks 34 to 36 of Your Pregnancy: Care Instructions. \" Current as of: November 21, 2017 Content Version: 11.7 © 1959-7806 Healthwise, Incorporated. Care instructions adapted under license by [employer] (which disclaims liability or warranty for this information). If you have questions about a medical condition or this instruction, always ask your healthcare professional. [place] [address] any warranty or liability for your use of this information. Introducing [facility]!    
 [address] introduces Worldrat patient portal. Now you can access parts of your medical record, email your doctor's office, and request medication refills online. 1. In your internet browser, go to [email or web address]/Plurilock Security Solutions 2. Click on the First Time User? Click Here link in the Sign In box. You will see the New Member Sign Up page. 3. Enter your RocketHub Access Code exactly as it appears below. You will not need to use this code after youve completed the sign-up process. If you do not sign up before the expiration date, you must request a new code. · RocketHub Access Code: [email or web address] Expires: 8/27/2018  1:21 PM 
 
4. Enter the last four digits of your Social Security Number (xxxx) and Date of Birth (mm/dd/yyyy) as indicated and click Submit. You will be taken to the next sign-up page. 5. Create a RocketHub ID. This will be your RocketHub login ID and cannot be changed, so think of one that is secure and easy to remember. 6. Create a RocketHub password. You can change your password at any time. 7. Enter your Password Reset Question and Answer. This can be used at a later time if you forget your password. 8. Enter your e-mail address. You will receive e-mail notification when new information is available in [address] 9. Click Sign Up. You can now view and download portions of your medical record. 10. Click the Download Summary menu link to download a portable copy of your medical information. If you have questions, please visit the Frequently Asked Questions section of the RocketHub website. Remember, RocketHub is NOT to be used for urgent needs. For medical emergencies, dial 911. Now available from your iPhone and Android! Introducing Larry Claudio As a [employer] patient, I wanted to make you aware of our electronic visit tool called Larry Claudio. [employer] 24/7 allows you to connect within minutes with a medical provider 24 hours a day, seven days a week via a mobile device or tablet or logging into a secure website from your computer. You can access ShopItToMe from anywhere in the [place]. A virtual visit might be right for you when you have a simple condition and feel like you just dont want to get out of bed, or cant get away from work for an appointment, when your regular [facility] provider is not available (evenings, weekends or holidays), or when youre out of town and need minor care. Electronic visits cost only $49 and if the [employer] 24/7 provider determines a prescription is needed to treat your condition, one can be electronically transmitted to a nearby pharmacy*. Please take a moment to enroll today if you have not already done so. The enrollment process is free and takes just a few minutes. To enroll, please download the Scryer mikey to your tablet or phone, or visit [email or web address] to enroll on your computer. And, as an [address] [place] [address] patient with a [employer] account, the results of your visits will be scanned into your electronic medical record and your primary care provider will be able to view the scanned results. We urge you to continue to see your regular [facility] provider for your ongoing medical care. And while your primary care provider may not be the one available when you seek a [employer] virtual visit, the peace of mind you get from getting a real diagnosis real time can be priceless. For more information on ShopItToMe, view our Frequently Asked Questions (FAQs) at [email or web address]. Sincerely, 
 
Paramjit Goode MD 
Chief Medical Officer [address] *:  certain medications cannot be prescribed via [employer] Providers Seen During Your Hospitalization Provider Specialty Primary office phone Warren Meza MD Obstetrics & Gynecology [phone] Your Primary Care Physician (PCP) Primary Care Physician Office Phone Office Fax NONE ** None ** ** None ** You are allergic to the following Allergen Reactions Tylenol (Acetaminophen) Other (comments) Hepatitis per pt can take tylenol Recent Documentation Height Weight BMI OB Status Smoking Status 1.626 m 88.9 kg 33.64 kg/m2 Pregnant Current Every Day Smoker Emergency Contacts Name Discharge Info Relation Home Work Mobile Cortes[employer] DISCHARGE CAREGIVER [3] Father [15] [phone] Patient Belongings The following personal items are in your possession at time of discharge: 
                             
 
  
  
 Please provide this summary of care documentation to your next provider. Signatures-by signing, you are acknowledging that this After Visit Summary has been reviewed with you and you have received a copy. Patient Signature:  ____________________________________________________________ Date:  ____________________________________________________________  
  
Radha Nicholson Provider Signature:  ____________________________________________________________ Date:  ____________________________________________________________

## 2018-08-25 NOTE — IP AVS SNAPSHOT
[address] [facility] 
[phone] Patient: Zara Carvalho MRN: [id number] :1990 A check evangelina indicates which time of day the medication should be taken. My Medications ASK your doctor about these medications Instructions Each Dose to Equal  
 Morning Noon Evening Bedtime ONE A DAY WOMEN'S PRENATAL DHA 28 mg iron- 800 mcg Cmpk Generic drug:  [id number] fum-folic ac-om3 Your last dose was: Your next dose is: Take 1 Tab by mouth daily. 1 Tab

## 2018-08-25 NOTE — PROGRESS NOTES
Pt presents to triage for intermittent leaking of fluid starting yesterday. Reports +FM, denies vag bleeding. Denies any complications with this pregnancy, however has had frequent visits for leaking fluid. 1950: Notified Dr. Jose Chen of patients arrival.   1955: Amnisure negative. 2000: Dr. Jose Chen at bedside. SVE and speculum exam performed. Nitrazine neg. 08/25/18 2005   Cervical Exam   Dilation (cm) 2   Eff 70 %   Vaginal exam done by? Judith     POC perform NST and plan for discharge. 2100: Dr. Jose Chen at bedside. US performed. Baby is vertex with RED of 13.7. Plan for discharge and follow up with Dr. Sobeida Munoz Thursday. 2109: Discharge instructions discussed with patient. Pt to discharge in NAD.

## 2018-08-25 NOTE — IP AVS SNAPSHOT
Summary of Care Report The Summary of Care report has been created to help improve care coordination. Users with access to [employer] or [employer] (Web-based application) may access additional patient information including the Discharge Summary. If you are not currently a [employer] [facility] user and need more information, please call the number listed below in the [place] [address] section and ask to be connected with Medical Records. Facility Information Name Address Phone [address] [address] [phone] Patient Information Patient Name Sex  Zach Blanca ([id number]) Female 1990 Discharge Information Admitting Provider Service Area Unit Rafael Ta MD / [address]d Triage / [phone] Discharge Provider Discharge Date/Time Discharge Disposition Destination (none) (none) (none) (none) Patient Language Language ENGLISH [13] Hospital Problems as of 2018  Reviewed: 2018  3:15 PM by Bobo Martinez MD  
 None Non-Hospital Problems as of 2018  Reviewed: 2018  3:15 PM by Bobo Martinez MD  
  
  
  
 Class Noted - Resolved Last Modified Active Problems Heart palpitations  2018 - Present 2018 by Candace Serrano LPN Entered by Candace Serrano LPN Pregnancy  2018 - Present 2018 by Conrad Moore MD  
  Entered by Federica Negro, DO You are allergic to the following Allergen Reactions Tylenol (Acetaminophen) Other (comments) Hepatitis per pt can take tylenol Current Discharge Medication List  
  
ASK your doctor about these medications Dose & Instructions Dispensing Information Comments ONE A DAY WOMEN'S PRENATAL DHA 28 mg iron- 800 mcg Cmpk Generic drug:  [id number] fum-folic ac-om3 Dose:  1 Tab Take 1 Tab by mouth daily. Refills:  0 Current Immunizations Name Date Rabies Immune Globulin 2018 Rabies- IM Fibroblast Culture 2018, 2018, 2018, 2018 Follow-up Information Follow up With Details Comments Contact Info Natty Hanna MD Go on 2018  [address] [facility] 
[phone] Discharge Instructions Weeks 34 to 36 of Your Pregnancy: Care Instructions Your Care Instructions By now, your baby and your belly have grown quite large. It is almost time to give birth. A full-term pregnancy can deliver between 37 and 42 weeks. Your baby's lungs are almost ready to breathe air. The bones in your baby's head are now firm enough to protect it, but soft enough to move down through the birth canal. 
You may feel excited, happy, anxious, or scared. You may wonder how you will know if you are in labor or what to expect during labor. Try to be flexible in your expectations of the birth. Because each birth is different, there is no way to know exactly what childbirth will be like for you. This care sheet will help you know what to expect and how to prepare. This may make your childbirth easier. If you haven't already had the Tdap shot during this pregnancy, talk to your doctor about getting it. It will help protect your  against pertussis infection. In the 36th week, most women have a test for group B streptococcus (GBS). GBS is a common bacteria that can live in the vagina and rectum. It can make your baby sick after birth. If you test positive, you will get antibiotics during labor. The medicine will keep your baby from getting the bacteria. Follow-up care is a key part of your treatment and safety.  Be sure to make and go to all appointments, and call your doctor if you are having problems. It's also a good idea to know your test results and keep a list of the medicines you take. How can you care for yourself at home? Learn about pain relief choices · Pain is different for every woman. Talk with your doctor about your feelings about pain. · You can choose from several types of pain relief. These include medicine or breathing techniques, as well as comfort measures. You can use more than one option. · If you choose to have pain medicine during labor, talk to your doctor about your options. Some medicines lower anxiety and help with some of the pain. Others make your lower body numb so that you won't feel pain. · Be sure to tell your doctor about your pain medicine choice before you start labor or very early in your labor. You may be able to change your mind as labor progresses. · Rarely, a woman is put to sleep by medicine given through a mask or an IV. Labor and delivery · The first stage of labor has three parts: early, active, and transition. ¨ Most women have early labor at home. You can stay busy or rest, eat light snacks, drink clear fluids, and start counting contractions. ¨ When talking during a contraction gets hard, you may be moving to active labor. During active labor, you should head for the hospital if you are not there already. ¨ You are in active labor when contractions come every 3 to 4 minutes and last about 60 seconds. Your cervix is opening more rapidly. ¨ If your water breaks, contractions will come faster and stronger. ¨ During transition, your cervix is stretching, and contractions are coming more rapidly. ¨ You may want to push, but your cervix might not be ready. Your doctor will tell you when to push. · The second stage starts when your cervix is completely opened and you are ready to push. ¨ Contractions are very strong to push the baby down the birth canal. 
¨ You will feel the urge to push. You may feel like you need to have a bowel movement. ¨ You may be coached to push with contractions. These contractions will be very strong, but you will not have them as often. You can get a little rest between contractions. ¨ You may be emotional and irritable. You may not be aware of what is going on around you. ¨ One last push, and your baby is born. · The third stage is when a few more contractions push out the placenta. This may take 30 minutes or less. · The fourth stage is the welcome recovery. You may feel overwhelmed with emotions and exhausted but alert. This is a good time to start breastfeeding. Where can you learn more? Go to http://queta-maxine.info/. Enter V129 in the search box to learn more about \"Weeks 34 to 36 of Your Pregnancy: Care Instructions. \" Current as of: November 21, 2017 Content Version: 11.7 © 6204-8076 [employer]. Care instructions adapted under license by [employer] (which disclaims liability or warranty for this information). If you have questions about a medical condition or this instruction, always ask your healthcare professional. [place] [address] any warranty or liability for your use of this information. Chart Review Routing History No Routing History on File

## 2018-08-26 NOTE — DISCHARGE INSTRUCTIONS

## 2018-08-26 NOTE — H&P
History & Physical    Name: Juany Lyles MRN: [id number]  SSN: [id number]    YOB: 1990  Age: 33 y.o. Sex: female      Subjective:     Reason for Admission:  Pregnancy and r/o SROM    History of Present Illness: Ms. Jessica Garcia is a   33 y.o.  female with an estimated gestational age of 26w5d with Estimated Date of Delivery: 18. Patient complains of mild vaginal leaking of fluid for 2 days not requiring pads. Predominately occasional dampness in panties. Pregnancy has been complicated by PMH-see below. Patient denies abdominal pain  , chest pain, contractions, fever, headache , nausea and vomiting, pelvic pressure, right upper quadrant pain  , shortness of breath, swelling, vaginal bleeding , visual disturbances and back pain, blood in urine, burning with urination, dysuria, urinary frequency and urinary urgency. OB History    Para Term  AB Living   2 1 1  0 1   SAB TAB Ectopic Molar Multiple Live Births        1      # Outcome Date GA Lbr Trent/2nd Weight Sex Delivery Anes PTL Lv   2 Current            1 Term 17   3.289 kg F Vag-Spont EPIDURAL AN N HUNTER        Past Medical History:   Diagnosis Date    [facility]     Drug addiction ([facility]     Hepatitis C     Psychiatric disorder     Psychiatric disorder      Past Surgical History:   Procedure Laterality Date    HX ACL RECONSTRUCTION      right knee    HX OTHER SURGICAL      HX TONSILLECTOMY      INNER EAR SURGERY PROC UNLISTED       Social History     Occupational History    Not on file.      Social History Main Topics    Smoking status: Current Every Day Smoker     Packs/day: 0.25    Smokeless tobacco: Never Used    Alcohol use No      Comment: No alcohol in 4 months    Drug use: No      Comment: \"I haven't used adderal for 17 months\", heroin , cocaine    Sexual activity: Yes      Family History   Problem Relation Age of Onset    Heart Disease Mother        Allergies   Allergen Reactions  Tylenol [Acetaminophen] Other (comments)     Hepatitis per pt can take tylenol       Prior to Admission medications    Medication Sig Start Date End Date Taking? Authorizing Provider   [id number] fum-folic ac-om3 (ONE A DAY WOMEN'S PRENATAL DHA) 28 mg iron- 800 mcg cmpk Take 1 Tab by mouth daily. 3/19/18  Yes Historical Provider        Review of Systems:  Constitutional: negative for fevers, chills and sweats  Respiratory: negative for cough, sputum, hemoptysis, asthma, wheezing or dyspnea on exertion  Cardiovascular: negative for chest pain, chest pressure/discomfort, dyspnea, palpitations, irregular heart beats, near-syncope  Gastrointestinal: negative for nausea, vomiting, melena, diarrhea and constipation  Genitourinary:negative for frequency, dysuria, nocturia and hematuria  Musculoskeletal:negative for myalgias, arthralgias, neck pain and back pain  Neurological: negative for headaches and dizziness     Objective:     Vitals:    Vitals:    18 1940 18 1945   BP: 124/80    Pulse: (!) 105    Resp: 18    Temp: 98 °F (36.7 °C)    SpO2: 98%    Weight:  88.9 kg (196 lb)   Height:  5' 4\" (1.626 m)      Temp (24hrs), Av °F (36.7 °C), Min:98 °F (36.7 °C), Max:98 °F (36.7 °C)    BP  Min: 124/80  Max: 124/80     Physical Exam:  Patient without distress.   Heart: Regular rate and rhythm, S1S2 present or without murmur or extra heart sounds  Lung: clear to auscultation throughout lung fields, no wheezes, no rales, no rhonchi and normal respiratory effort  Back: costovertebral angle tenderness absent  Abdomen: soft, nontender, nondistended, normal bowel sounds  Fundus: soft and non tender  Perineum: blood absent, amniotic fluid absent  Cervical Exam: 2 cm dilated    70% effaced    Lower Extremities:  - Edema No   - No evidence of DVT seen on physical exam.   Neuro:- Patellar Reflexes: 2+ bilaterally   - Clonus: absent  Skin: no lesions or rash   Neck: No adenopathy or thyroidmegaly    Membranes: Intact, neg amnisure,Nitrazine,pooling with valsalva  Uterine Activity:  None  Fetal Heart Rate:  Reactive  Variability: moderate  Accelerations: yes  RED= 13.7, vtx       Lab/Data Review:  Recent Results (from the past 24 hour(s))   RUPTURE OF FETAL MEMBRANES, POC    Collection Time: 08/25/18  7:55 PM   Result Value Ref Range    Rupture of fetal membrane Negative Negative    Control line present? Acceptable     Internal negative control Acceptable     Kit Lot No. [id number]     Expiration date 07/28/2020    PH BY NITRAZINE, POC    Collection Time: 08/25/18  8:10 PM   Result Value Ref Range    pH-Nitrazine paper 5.0 5.0 - 6.1    Daily QC performed? Yes        Assessment and Plan: Active Problems:    * No active hospital problems. *     1) SROM ruled out  2) IUP at 35w6d    Plan: Home with PTL and FM precautions. F/U with Dr Rusty Boo as scheduled.     Signed By:  Miguel Nelson MD     August 25, 2018

## 2018-09-12 ENCOUNTER — HOSPITAL ENCOUNTER (INPATIENT)
Age: 28
LOS: 3 days | Discharge: HOME | DRG: 560 | End: 2018-09-15
Attending: SPECIALIST | Admitting: SPECIALIST
Payer: MEDICAID

## 2018-09-12 LAB
BASOPHILS # BLD: 0 K/UL (ref 0–0.1)
BASOPHILS NFR BLD: 0 % (ref 0–1)
DIFFERENTIAL METHOD BLD: ABNORMAL
EOSINOPHIL # BLD: 0.1 K/UL (ref 0–0.4)
EOSINOPHIL NFR BLD: 1 % (ref 0–7)
ERYTHROCYTE [DISTWIDTH] IN BLOOD BY AUTOMATED COUNT: 14 % (ref 11.5–14.5)
GRBS, EXTERNAL: NEGATIVE
HCT VFR BLD AUTO: 36 % (ref 35–47)
HGB BLD-MCNC: 12.1 G/DL (ref 11.5–16)
IMM GRANULOCYTES # BLD: 0.1 K/UL (ref 0–0.04)
IMM GRANULOCYTES NFR BLD AUTO: 1 % (ref 0–0.5)
LYMPHOCYTES # BLD: 2 K/UL (ref 0.8–3.5)
LYMPHOCYTES NFR BLD: 19 % (ref 12–49)
MCH RBC QN AUTO: 31 PG (ref 26–34)
MCHC RBC AUTO-ENTMCNC: 33.6 G/DL (ref 30–36.5)
MCV RBC AUTO: 92.3 FL (ref 80–99)
MONOCYTES # BLD: 0.5 K/UL (ref 0–1)
MONOCYTES NFR BLD: 5 % (ref 5–13)
NEUTS SEG # BLD: 7.7 K/UL (ref 1.8–8)
NEUTS SEG NFR BLD: 74 % (ref 32–75)
NRBC # BLD: 0 K/UL (ref 0–0.01)
NRBC BLD-RTO: 0 PER 100 WBC
PLATELET # BLD AUTO: 226 K/UL (ref 150–400)
PMV BLD AUTO: 11.1 FL (ref 8.9–12.9)
RBC # BLD AUTO: 3.9 M/UL (ref 3.8–5.2)
WBC # BLD AUTO: 10.3 K/UL (ref 3.6–11)

## 2018-09-12 PROCEDURE — 77030021125

## 2018-09-12 PROCEDURE — 85025 COMPLETE CBC W/AUTO DIFF WBC: CPT | Performed by: SPECIALIST

## 2018-09-12 PROCEDURE — A4300 CATH IMPL VASC ACCESS PORTAL: HCPCS

## 2018-09-12 PROCEDURE — 4A0HXCZ MEASUREMENT OF PRODUCTS OF CONCEPTION, CARDIAC RATE, EXTERNAL APPROACH: ICD-10-PCS | Performed by: SPECIALIST

## 2018-09-12 PROCEDURE — 65410000002 HC RM PRIVATE OB

## 2018-09-12 PROCEDURE — 75410000002 HC LABOR FEE PER 1 HR

## 2018-09-12 PROCEDURE — 36415 COLL VENOUS BLD VENIPUNCTURE: CPT | Performed by: SPECIALIST

## 2018-09-12 PROCEDURE — 77030034849

## 2018-09-12 PROCEDURE — 74011250636 HC RX REV CODE- 250/636: Performed by: SPECIALIST

## 2018-09-12 RX ORDER — NALOXONE HYDROCHLORIDE 0.4 MG/ML
0.4 INJECTION, SOLUTION INTRAMUSCULAR; INTRAVENOUS; SUBCUTANEOUS AS NEEDED
Status: DISCONTINUED | OUTPATIENT
Start: 2018-09-12 | End: 2018-09-13

## 2018-09-12 RX ORDER — SODIUM CHLORIDE, SODIUM LACTATE, POTASSIUM CHLORIDE, CALCIUM CHLORIDE 600; 310; 30; 20 MG/100ML; MG/100ML; MG/100ML; MG/100ML
125 INJECTION, SOLUTION INTRAVENOUS CONTINUOUS
Status: DISCONTINUED | OUTPATIENT
Start: 2018-09-12 | End: 2018-09-13

## 2018-09-12 RX ORDER — ZOLPIDEM TARTRATE 5 MG/1
5 TABLET ORAL
Status: DISCONTINUED | OUTPATIENT
Start: 2018-09-12 | End: 2018-09-15 | Stop reason: HOSPADM

## 2018-09-12 RX ORDER — OXYTOCIN/0.9 % SODIUM CHLORIDE 30/500 ML
0-25 PLASTIC BAG, INJECTION (ML) INTRAVENOUS
Status: DISCONTINUED | OUTPATIENT
Start: 2018-09-12 | End: 2018-09-13

## 2018-09-12 RX ADMIN — SODIUM CHLORIDE, SODIUM LACTATE, POTASSIUM CHLORIDE, AND CALCIUM CHLORIDE 125 ML/HR: 600; 310; 30; 20 INJECTION, SOLUTION INTRAVENOUS at 12:18

## 2018-09-12 NOTE — PROGRESS NOTES
Pt is here from Dr. Roger Howard office for induction of labor due to oligio and decrease fetal movement. Pt states she had brown discharge yesterday afternoon. Pt states her back is starting to hurt and she feels she is leaking fluid. States she has not felt the baby move a lot today. [address] to Dr. Lyndon Garsia over the phone. Pt was 3 cm in office today. The POC is to start Pitocin at 4056-3195 if patient does not go into labor on her own. Pt can come off the monitor and eat. 1250 Pt aware of POC. 2025 pt feels toco is not picking up contractions while on side. Pt sitting up on her back and toco left on. Reactive strip 2045 Spoke to Dr. Enrique Angelo over the phone. Will start Pitocin between 7737-0455 am. Pt can have ambien as desired and come off monitor. 2118 IV flushed 
 
2120 Pt up to shower 2300 report given to TONY Chen RN. Care turned over at this time.

## 2018-09-12 NOTE — IP AVS SNAPSHOT
Summary of Care Report The Summary of Care report has been created to help improve care coordination. Users with access to [employer] or [employer] (Web-based application) may access additional patient information including the Discharge Summary. If you are not currently a [employer] user and need more information, please call the number listed below in the [place] [address] section and ask to be connected with Medical Records. Facility Information Name Address Phone [address] [address] [phone] Patient Information Patient Name Sex  Yonny Richardson ([id number]) Female 1990 Discharge Information Admitting Provider Service Area Unit Litzy Garcia MD / [phone] [address] [facility] 3 Mother Infant / [phone] Discharge Provider Discharge Date/Time Discharge Disposition Destination (none) 9/15/2018 (Pending) AHR (none) Patient Language Language ENGLISH [13] Hospital Problems as of 9/15/2018  Reviewed: 2018  4:46 AM by Trudy Cramer MD  
  
  
  
 Class Noted - Resolved Last Modified POA Active Problems Pregnancy  2018 - Present 2018 by Litzy Garcia MD Unknown Entered by Andreea Steven DO Non-Hospital Problems as of 9/15/2018  Reviewed: 2018  4:46 AM by Trudy Cramer MD  
  
  
  
 Class Noted - Resolved Last Modified Active Problems Heart palpitations  2018 - Present 2018 by Gely Diop LPN Entered by Gely Diop LPN You are allergic to the following Allergen Reactions Tylenol (Acetaminophen) Other (comments) Hepatitis per pt can take tylenol Current Discharge Medication List  
  
START taking these medications Dose & Instructions Dispensing Information Comments docusate sodium 100 mg capsule Commonly known as:  Qiana Warren Dose:  100 mg Take 1 Cap by mouth two (2) times a day for 90 days. Quantity:  60 Cap Refills:  2  
   
 ibuprofen 800 mg tablet Commonly known as:  MOTRIN Dose:  800 mg Take 1 Tab by mouth every eight (8) hours. Quantity:  30 Tab Refills:  1 CONTINUE these medications which have NOT CHANGED Dose & Instructions Dispensing Information Comments ONE A DAY WOMEN'S PRENATAL DHA 28 mg iron- 800 mcg Cmpk Generic drug:  [id number] fum-folic ac-om3 Dose:  1 Tab Take 1 Tab by mouth daily. Refills:  0 Current Immunizations Name Date Rabies Immune Globulin 2018 Rabies- IM Fibroblast Culture 2018, 2018, 2018, 2018 Tdap 2018 Surgery Information ID Date/Time Status Primary Surgeon All Procedures Location [id number] 2018 Complete   ZZANESTHESIA [facility] - DO NOT SCHEDULE Follow-up Information Follow up With Details Comments Contact Info Provider Unknown   Patient not available to ask Wilder Romero Schedule an appointment as soon as possible for a visit in 4 weeks or , As needed, If symptoms worsen [phone] Discharge Instructions After Your Delivery (the Postpartum Period): Care Instructions Your Care Instructions Congratulations on the birth of your baby. Like pregnancy, the  period can be a time of excitement, indigo, and exhaustion. You may look at your wondrous little baby and feel happy. You may also be overwhelmed by your new sleep hours and new responsibilities. At first, babies often sleep during the days and are awake at night. They do not have a pattern or routine. They may make sudden gasps, jerk themselves awake, or look like they have crossed eyes. These are all normal, and they may even make you smile. In these first weeks after delivery, try to take good care of yourself.  It may take 4 to 6 weeks to feel like yourself again, and possibly longer if you had a  birth. You will likely feel very tired for several weeks. Your days will be full of ups and downs, but lots of indigo as well. Follow-up care is a key part of your treatment and safety. Be sure to make and go to all appointments, and call your doctor if you are having problems. It's also a good idea to know your test results and keep a list of the medicines you take. How can you care for yourself at home? Take care of your body after delivery · Use pads instead of tampons for the bloody flow that may last as long as 2 weeks. · Ease cramps with ibuprofen (Advil, Motrin). · Ease soreness of hemorrhoids and the area between your vagina and rectum with ice compresses or witch hazel pads. · Ease constipation by drinking lots of fluid and eating high-fiber foods. Ask your doctor about over-the-counter stool softeners. · Cleanse yourself with a gentle squeeze of warm water from a bottle instead of wiping with toilet paper. · Take a sitz bath in warm water several times a day. · Wear a good nursing bra. Ease sore and swollen breasts with warm, wet washcloths. · If you are not breastfeeding, use ice rather than heat for breast soreness. · Your period may not start for several months if you are breastfeeding. You may bleed more, and longer at first, than you did before you got pregnant. · Wait until you are healed (about 4 to 6 weeks) before you have sexual intercourse. Your doctor will tell you when it is okay to have sex. · Try not to travel with your baby for 5 or 6 weeks. If you take a long car trip, make frequent stops to walk around and stretch. Avoid exhaustion · Rest every day. Try to nap when your baby naps. · Ask another adult to be with you for a few days after delivery. · Plan for  if you have other children. · Stay flexible so you can eat at odd hours and sleep when you need to. Both you and your baby are making new schedules. · Plan small trips to get out of the house. Change can make you feel less tired. · Ask for help with housework, cooking, and shopping. Remind yourself that your job is to care for your baby. Know about help for postpartum depression · \"Baby blues\" are common for the first 1 to 2 weeks after birth. You may cry or feel sad or irritable for no reason. · Rest whenever you can. Being tired makes it harder to handle your emotions. · Go for walks with your baby. · Talk to your partner, friends, and family about your feelings. · If your symptoms last for more than a few weeks, or if you feel very depressed, ask your doctor for help. · Postpartum depression can be treated. Support groups and counseling can help. Sometimes medicine can also help. Stay healthy · Eat healthy foods so you have more energy, make good breast milk, and lose extra baby pounds. · If you breastfeed, avoid alcohol and drugs. If you quit smoking during pregnancy, try to stay smoke-free. · Start daily exercise after 4 to 6 weeks, but rest when you feel tired. · Learn exercises to tone your belly. Do Kegel exercises to regain strength in your pelvic muscles. You can do these exercises while you stand or sit. ¨ Squeeze the same muscles you would use to stop your urine. Your belly and thighs should not move. ¨ Hold the squeeze for 3 seconds, and then relax for 3 seconds. ¨ Start with 3 seconds. Then add 1 second each week until you are able to squeeze for 10 seconds. ¨ Repeat the exercise 10 to 15 times for each session. Do three or more sessions each day. · Find a class for new mothers and new babies that has an exercise time. · If you had a  birth, give yourself a bit more time before you exercise, and be careful. When should you call for help? Call 911 anytime you think you may need emergency care. For example, call if: 
  · You passed out (lost consciousness).  Call your doctor now or seek immediate medical care if: 
  · You have severe vaginal bleeding. This means you are passing blood clots and soaking through a pad each hour for 2 or more hours.  
  · You are dizzy or lightheaded, or you feel like you may faint.  
  · You have a fever.  
  · You have new belly pain, or your pain gets worse.  
 Watch closely for changes in your health, and be sure to contact your doctor if: 
  · Your vaginal bleeding seems to be getting heavier.  
  · You have new or worse vaginal discharge.  
  · You feel sad, anxious, or hopeless for more than a few days.  
  · You do not get better as expected. Where can you learn more? Go to http://queta-maxine.info/. Enter A461 in the search box to learn more about \"After Your Delivery (the Postpartum Period): Care Instructions. \" Current as of: November 21, 2017 Content Version: 11.7 © 9901-7894 [employer]. Care instructions adapted under license by [employer] (which disclaims liability or warranty for this information). If you have questions about a medical condition or this instruction, always ask your healthcare professional. [place] [address] any warranty or liability for your use of this information. Chart Review Routing History Recipient Method Report Sent By Grace Garrett Provider Anderson, MD  
Patient not available to ask [address] Mail IP Auto Routed Notes Camelia Lai MD [[id number]] 9/15/2018 10:37 AM 09/15/2018

## 2018-09-12 NOTE — IP AVS SNAPSHOT
[address] [facility] 
[phone] Patient: Ambrose Posey MRN: [id number] :1990 About your hospitalization You were admitted on:  2018 You last received care in the:  [facility] You were discharged on:  September 15, 2018 Why you were hospitalized Your primary diagnosis was:  Not on File Your diagnoses also included:  Pregnancy Follow-up Information Follow up With Details Comments Contact Info Provider Unknown   Patient not available to ask Wilder Romero Schedule an appointment as soon as possible for a visit in 4 weeks or , As needed, If symptoms worsen [phone] Discharge Orders None A check evangelina indicates which time of day the medication should be taken. My Medications START taking these medications Instructions Each Dose to Equal  
 Morning Noon Evening Bedtime  
 docusate sodium 100 mg capsule Commonly known as:  Teddy Dickinson Your last dose was: Your next dose is: Take 1 Cap by mouth two (2) times a day for 90 days. 100 mg  
    
   
   
   
  
 ibuprofen 800 mg tablet Commonly known as:  MOTRIN Your last dose was: Your next dose is: Take 1 Tab by mouth every eight (8) hours. 800 mg CONTINUE taking these medications Instructions Each Dose to Equal  
 Morning Noon Evening Bedtime ONE A DAY WOMEN'S PRENATAL DHA 28 mg iron- 800 mcg Cmpk Generic drug:  [id number] fum-folic ac-om3 Your last dose was: Your next dose is: Take 1 Tab by mouth daily. 1 Tab Where to Get Your Medications Information on where to get these meds will be given to you by the nurse or doctor. ! Ask your nurse or doctor about these medications  
  docusate sodium 100 mg capsule ibuprofen 800 mg tablet Discharge Instructions After Your Delivery (the Postpartum Period): Care Instructions Your Care Instructions Congratulations on the birth of your baby. Like pregnancy, the  period can be a time of excitement, indigo, and exhaustion. You may look at your wondrous little baby and feel happy. You may also be overwhelmed by your new sleep hours and new responsibilities. At first, babies often sleep during the days and are awake at night. They do not have a pattern or routine. They may make sudden gasps, jerk themselves awake, or look like they have crossed eyes. These are all normal, and they may even make you smile. In these first weeks after delivery, try to take good care of yourself. It may take 4 to 6 weeks to feel like yourself again, and possibly longer if you had a  birth. You will likely feel very tired for several weeks. Your days will be full of ups and downs, but lots of indigo as well. Follow-up care is a key part of your treatment and safety. Be sure to make and go to all appointments, and call your doctor if you are having problems. It's also a good idea to know your test results and keep a list of the medicines you take. How can you care for yourself at home? Take care of your body after delivery · Use pads instead of tampons for the bloody flow that may last as long as 2 weeks. · Ease cramps with ibuprofen (Advil, Motrin). · Ease soreness of hemorrhoids and the area between your vagina and rectum with ice compresses or witch hazel pads. · Ease constipation by drinking lots of fluid and eating high-fiber foods. Ask your doctor about over-the-counter stool softeners. · Cleanse yourself with a gentle squeeze of warm water from a bottle instead of wiping with toilet paper. · Take a sitz bath in warm water several times a day. · Wear a good nursing bra. Ease sore and swollen breasts with warm, wet washcloths. · If you are not breastfeeding, use ice rather than heat for breast soreness. · Your period may not start for several months if you are breastfeeding. You may bleed more, and longer at first, than you did before you got pregnant. · Wait until you are healed (about 4 to 6 weeks) before you have sexual intercourse. Your doctor will tell you when it is okay to have sex. · Try not to travel with your baby for 5 or 6 weeks. If you take a long car trip, make frequent stops to walk around and stretch. Avoid exhaustion · Rest every day. Try to nap when your baby naps. · Ask another adult to be with you for a few days after delivery. · Plan for  if you have other children. · Stay flexible so you can eat at odd hours and sleep when you need to. Both you and your baby are making new schedules. · Plan small trips to get out of the house. Change can make you feel less tired. · Ask for help with housework, cooking, and shopping. Remind yourself that your job is to care for your baby. Know about help for postpartum depression · \"Baby blues\" are common for the first 1 to 2 weeks after birth. You may cry or feel sad or irritable for no reason. · Rest whenever you can. Being tired makes it harder to handle your emotions. · Go for walks with your baby. · Talk to your partner, friends, and family about your feelings. · If your symptoms last for more than a few weeks, or if you feel very depressed, ask your doctor for help. · Postpartum depression can be treated. Support groups and counseling can help. Sometimes medicine can also help. Stay healthy · Eat healthy foods so you have more energy, make good breast milk, and lose extra baby pounds. · If you breastfeed, avoid alcohol and drugs. If you quit smoking during pregnancy, try to stay smoke-free. · Start daily exercise after 4 to 6 weeks, but rest when you feel tired. · Learn exercises to tone your belly.  Do Kegel exercises to regain strength in your pelvic muscles. You can do these exercises while you stand or sit. ¨ Squeeze the same muscles you would use to stop your urine. Your belly and thighs should not move. ¨ Hold the squeeze for 3 seconds, and then relax for 3 seconds. ¨ Start with 3 seconds. Then add 1 second each week until you are able to squeeze for 10 seconds. ¨ Repeat the exercise 10 to 15 times for each session. Do three or more sessions each day. · Find a class for new mothers and new babies that has an exercise time. · If you had a  birth, give yourself a bit more time before you exercise, and be careful. When should you call for help? Call 911 anytime you think you may need emergency care. For example, call if: 
  · You passed out (lost consciousness).  
 Call your doctor now or seek immediate medical care if: 
  · You have severe vaginal bleeding. This means you are passing blood clots and soaking through a pad each hour for 2 or more hours.  
  · You are dizzy or lightheaded, or you feel like you may faint.  
  · You have a fever.  
  · You have new belly pain, or your pain gets worse.  
 Watch closely for changes in your health, and be sure to contact your doctor if: 
  · Your vaginal bleeding seems to be getting heavier.  
  · You have new or worse vaginal discharge.  
  · You feel sad, anxious, or hopeless for more than a few days.  
  · You do not get better as expected. Where can you learn more? Go to http://queta-maxine.info/. Enter A461 in the search box to learn more about \"After Your Delivery (the Postpartum Period): Care Instructions. \" Current as of: 2017 Content Version: 11.7 © 8714-7096 [employer]. Care instructions adapted under license by [employer] (which disclaims liability or warranty for this information).  If you have questions about a medical condition or this instruction, always ask your healthcare professional. [employer] disclaims any warranty or liability for your use of this information. Ad Infuse Announcement We are excited to announce that we are making your provider's discharge notes available to you in Ad Infuse. You will see these notes when they are completed and signed by the physician that discharged you from your recent hospital stay. If you have any questions or concerns about any information you see in Ad Infuse, please call the Health Information Department where you were seen or reach out to your Primary Care Provider for more information about your plan of care. Introducing [facility]! Dwayne Lew introduces Ad Infuse patient portal. Now you can access parts of your medical record, email your doctor's office, and request medication refills online. 1. In your internet browser, go to [email or web address]/Business Insider 2. Click on the First Time User? Click Here link in the Sign In box. You will see the New Member Sign Up page. 3. Enter your Ad Infuse Access Code exactly as it appears below. You will not need to use this code after youve completed the sign-up process. If you do not sign up before the expiration date, you must request a new code. · Ad Infuse Access Code: [email or web address] Expires: 12/14/2018 11:03 AM 
 
4. Enter the last four digits of your Social Security Number (xxxx) and Date of Birth (mm/dd/yyyy) as indicated and click Submit. You will be taken to the next sign-up page. 5. Create a Ad Infuse ID. This will be your Ad Infuse login ID and cannot be changed, so think of one that is secure and easy to remember. 6. Create a [employer]use password. You can change your password at any time. 7. Enter your Password Reset Question and Answer. This can be used at a later time if you forget your password. 8. Enter your e-mail address. You will receive e-mail notification when new information is available in [address] 9. Click Sign Up. You can now view and download portions of your medical record. 10. Click the Download Summary menu link to download a portable copy of your medical information. If you have questions, please visit the Frequently Asked Questions section of the XIPWIREt website. Remember, [employer] is NOT to be used for urgent needs. For medical emergencies, dial 911. Now available from your iPhone and Android! Introducing Larry Claudio As a [facility] patient, I wanted to make you aware of our electronic visit tool called Larry Claudio. RealLollipuff 24/7 allows you to connect within minutes with a medical provider 24 hours a day, seven days a week via a mobile device or tablet or logging into a secure website from your computer. You can access Larry Claudio from anywhere in the [place]. A virtual visit might be right for you when you have a simple condition and feel like you just dont want to get out of bed, or cant get away from work for an appointment, when your regular [facility] provider is not available (evenings, weekends or holidays), or when youre out of town and need minor care. Electronic visits cost only $49 and if the RealFameBit [facility] 24/7 provider determines a prescription is needed to treat your condition, one can be electronically transmitted to a nearby pharmacy*. Please take a moment to enroll today if you have not already done so. The enrollment process is free and takes just a few minutes. To enroll, please download the Cognitive Match 24/7 mikey to your tablet or phone, or visit [email or web address] to enroll on your computer. And, as an [address] [place] [address] patient with a [employer] account, the results of your visits will be scanned into your electronic medical record and your primary care provider will be able to view the scanned results.    
We urge you to continue to see your regular [facility] provider for your ongoing medical care. And while your primary care provider may not be the one available when you seek a Larry Albertfin virtual visit, the peace of mind you get from getting a real diagnosis real time can be priceless. For more information on Larry Claudio, view our Frequently Asked Questions (FAQs) at [email or web address]. Sincerely, 
 
Khurram Lou MD 
Chief Medical Officer Hannah Mauro *:  certain medications cannot be prescribed via Larry Escobarolayinkafin Providers Seen During Your Hospitalization Provider Specialty Primary office phone Karolyn Lilly MD Obstetrics & Gynecology [phone] Immunizations Administered for This Admission Name Date Tdap 9/14/2018 Your Primary Care Physician (PCP) Primary Care Physician Office Phone Office Fax UNKNOWN, PROVIDER ** None ** ** None ** You are allergic to the following Allergen Reactions Tylenol (Acetaminophen) Other (comments) Hepatitis per pt can take tylenol Recent Documentation Height Weight Breastfeeding? BMI OB Status Smoking Status 1.626 m 90.7 kg No 34.33 kg/m2 Pregnant Current Every Day Smoker Emergency Contacts Name Discharge Info Relation Home Work Mobile KolbSb DISCHARGE CAREGIVER [3] Father [15] [phone] Patient Belongings The following personal items are in your possession at time of discharge: 
  Dental Appliances: None  Visual Aid: None      Home Medications: None   Jewelry: Body Piercing, Bracelet  Clothing: Footwear, Undergarments, Pants, Shirt    Other Valuables: Candance Josh, Cell Phone, Other (comment) (baby stroller and diaper bag)  Personal Items Sent to Safe: None Please provide this summary of care documentation to your next provider. Signatures-by signing, you are acknowledging that this After Visit Summary has been reviewed with you and you have received a copy. Patient Signature:  ____________________________________________________________ Date:  ____________________________________________________________  
  
Meagan Rand Provider Signature:  ____________________________________________________________ Date:  ____________________________________________________________

## 2018-09-12 NOTE — IP AVS SNAPSHOT
[address] [facility]. 
[phone] Patient: Faustina West MRN: [id number] :1990 A check evangelina indicates which time of day the medication should be taken. My Medications START taking these medications Instructions Each Dose to Equal  
 Morning Noon Evening Bedtime  
 docusate sodium 100 mg capsule Commonly known as:  Delia Alvarez Your last dose was: Your next dose is: Take 1 Cap by mouth two (2) times a day for 90 days. 100 mg  
    
   
   
   
  
 ibuprofen 800 mg tablet Commonly known as:  MOTRIN Your last dose was: Your next dose is: Take 1 Tab by mouth every eight (8) hours. 800 mg CONTINUE taking these medications Instructions Each Dose to Equal  
 Morning Noon Evening Bedtime ONE A DAY WOMEN'S PRENATAL DHA 28 mg iron- 800 mcg Cmpk Generic drug:  [id number] fum-folic ac-om3 Your last dose was: Your next dose is: Take 1 Tab by mouth daily. 1 Tab Where to Get Your Medications Information on where to get these meds will be given to you by the nurse or doctor. ! Ask your nurse or doctor about these medications  
  docusate sodium 100 mg capsule  
 ibuprofen 800 mg tablet

## 2018-09-13 ENCOUNTER — ANESTHESIA EVENT (OUTPATIENT)
Dept: LABOR AND DELIVERY | Age: 28
DRG: 560 | End: 2018-09-13
Payer: MEDICAID

## 2018-09-13 ENCOUNTER — ANESTHESIA (OUTPATIENT)
Dept: LABOR AND DELIVERY | Age: 28
DRG: 560 | End: 2018-09-13
Payer: MEDICAID

## 2018-09-13 PROCEDURE — 74011250636 HC RX REV CODE- 250/636: Performed by: ANESTHESIOLOGY

## 2018-09-13 PROCEDURE — 74011250636 HC RX REV CODE- 250/636: Performed by: SPECIALIST

## 2018-09-13 PROCEDURE — 00HU33Z INSERTION OF INFUSION DEVICE INTO SPINAL CANAL, PERCUTANEOUS APPROACH: ICD-10-PCS | Performed by: ANESTHESIOLOGY

## 2018-09-13 PROCEDURE — 0HQ9XZZ REPAIR PERINEUM SKIN, EXTERNAL APPROACH: ICD-10-PCS | Performed by: SPECIALIST

## 2018-09-13 PROCEDURE — 74011250637 HC RX REV CODE- 250/637: Performed by: SPECIALIST

## 2018-09-13 PROCEDURE — 65410000002 HC RM PRIVATE OB

## 2018-09-13 PROCEDURE — 75410000002 HC LABOR FEE PER 1 HR

## 2018-09-13 PROCEDURE — 76060000078 HC EPIDURAL ANESTHESIA

## 2018-09-13 PROCEDURE — A4300 CATH IMPL VASC ACCESS PORTAL: HCPCS | Performed by: ANESTHESIOLOGY

## 2018-09-13 PROCEDURE — 77030014125 HC TY EPDRL BBMI -B: Performed by: ANESTHESIOLOGY

## 2018-09-13 PROCEDURE — 3E0R3BZ INTRODUCTION OF ANESTHETIC AGENT INTO SPINAL CANAL, PERCUTANEOUS APPROACH: ICD-10-PCS | Performed by: ANESTHESIOLOGY

## 2018-09-13 PROCEDURE — 75410000003 HC RECOV DEL/VAG/CSECN EA 0.5 HR

## 2018-09-13 PROCEDURE — 74011000250 HC RX REV CODE- 250

## 2018-09-13 PROCEDURE — 75410000000 HC DELIVERY VAGINAL/SINGLE

## 2018-09-13 RX ORDER — SIMETHICONE 80 MG
80 TABLET,CHEWABLE ORAL
Status: DISCONTINUED | OUTPATIENT
Start: 2018-09-13 | End: 2018-09-15 | Stop reason: HOSPADM

## 2018-09-13 RX ORDER — IBUPROFEN 400 MG/1
800 TABLET ORAL EVERY 8 HOURS
Status: DISCONTINUED | OUTPATIENT
Start: 2018-09-13 | End: 2018-09-15 | Stop reason: HOSPADM

## 2018-09-13 RX ORDER — OXYTOCIN/RINGER'S LACTATE 20/1000 ML
PLASTIC BAG, INJECTION (ML) INTRAVENOUS
Status: DISCONTINUED
Start: 2018-09-13 | End: 2018-09-13

## 2018-09-13 RX ORDER — FACIAL-BODY WIPES
10 EACH TOPICAL DAILY PRN
Status: DISCONTINUED | OUTPATIENT
Start: 2018-09-13 | End: 2018-09-15 | Stop reason: HOSPADM

## 2018-09-13 RX ORDER — FENTANYL/BUPIVACAINE/NS/PF 2-1250MCG
1-16 PREFILLED PUMP RESERVOIR EPIDURAL CONTINUOUS
Status: DISCONTINUED | OUTPATIENT
Start: 2018-09-13 | End: 2018-09-13

## 2018-09-13 RX ORDER — BUPIVACAINE HYDROCHLORIDE AND EPINEPHRINE 2.5; 5 MG/ML; UG/ML
INJECTION, SOLUTION EPIDURAL; INFILTRATION; INTRACAUDAL; PERINEURAL
Status: COMPLETED
Start: 2018-09-13 | End: 2018-09-13

## 2018-09-13 RX ORDER — NALOXONE HYDROCHLORIDE 0.4 MG/ML
0.4 INJECTION, SOLUTION INTRAMUSCULAR; INTRAVENOUS; SUBCUTANEOUS AS NEEDED
Status: DISCONTINUED | OUTPATIENT
Start: 2018-09-13 | End: 2018-09-15 | Stop reason: HOSPADM

## 2018-09-13 RX ORDER — SODIUM CHLORIDE 0.9 % (FLUSH) 0.9 %
5-10 SYRINGE (ML) INJECTION AS NEEDED
Status: DISCONTINUED | OUTPATIENT
Start: 2018-09-13 | End: 2018-09-15 | Stop reason: HOSPADM

## 2018-09-13 RX ORDER — DIPHENHYDRAMINE HCL 25 MG
25 CAPSULE ORAL
Status: DISCONTINUED | OUTPATIENT
Start: 2018-09-13 | End: 2018-09-15 | Stop reason: HOSPADM

## 2018-09-13 RX ORDER — HYDROCORTISONE ACETATE PRAMOXINE HCL 2.5; 1 G/100G; G/100G
CREAM TOPICAL AS NEEDED
Status: DISCONTINUED | OUTPATIENT
Start: 2018-09-13 | End: 2018-09-15 | Stop reason: HOSPADM

## 2018-09-13 RX ORDER — SODIUM CHLORIDE, SODIUM LACTATE, POTASSIUM CHLORIDE, CALCIUM CHLORIDE 600; 310; 30; 20 MG/100ML; MG/100ML; MG/100ML; MG/100ML
125 INJECTION, SOLUTION INTRAVENOUS CONTINUOUS
Status: DISCONTINUED | OUTPATIENT
Start: 2018-09-13 | End: 2018-09-13

## 2018-09-13 RX ORDER — BUPIVACAINE HYDROCHLORIDE AND EPINEPHRINE 2.5; 5 MG/ML; UG/ML
INJECTION, SOLUTION EPIDURAL; INFILTRATION; INTRACAUDAL; PERINEURAL AS NEEDED
Status: DISCONTINUED | OUTPATIENT
Start: 2018-09-13 | End: 2018-09-13 | Stop reason: HOSPADM

## 2018-09-13 RX ORDER — SODIUM CHLORIDE 0.9 % (FLUSH) 0.9 %
5-10 SYRINGE (ML) INJECTION EVERY 8 HOURS
Status: DISCONTINUED | OUTPATIENT
Start: 2018-09-13 | End: 2018-09-13

## 2018-09-13 RX ORDER — ONDANSETRON 4 MG/1
4 TABLET, ORALLY DISINTEGRATING ORAL
Status: ACTIVE | OUTPATIENT
Start: 2018-09-13 | End: 2018-09-14

## 2018-09-13 RX ORDER — FENTANYL/BUPIVACAINE/NS/PF 2-1250MCG
PREFILLED PUMP RESERVOIR EPIDURAL
Status: DISCONTINUED
Start: 2018-09-13 | End: 2018-09-13

## 2018-09-13 RX ADMIN — SODIUM CHLORIDE, SODIUM LACTATE, POTASSIUM CHLORIDE, AND CALCIUM CHLORIDE 125 ML/HR: 600; 310; 30; 20 INJECTION, SOLUTION INTRAVENOUS at 05:06

## 2018-09-13 RX ADMIN — IBUPROFEN 800 MG: 400 TABLET ORAL at 08:01

## 2018-09-13 RX ADMIN — IBUPROFEN 800 MG: 400 TABLET ORAL at 23:53

## 2018-09-13 RX ADMIN — Medication 12 ML/HR: at 05:08

## 2018-09-13 RX ADMIN — BUPIVACAINE HYDROCHLORIDE AND EPINEPHRINE 6 ML: 2.5; 5 INJECTION, SOLUTION EPIDURAL; INFILTRATION; INTRACAUDAL; PERINEURAL at 04:59

## 2018-09-13 RX ADMIN — BUPIVACAINE HYDROCHLORIDE AND EPINEPHRINE 0.4 ML: 2.5; 5 INJECTION, SOLUTION EPIDURAL; INFILTRATION; INTRACAUDAL; PERINEURAL at 04:58

## 2018-09-13 RX ADMIN — SODIUM CHLORIDE, SODIUM LACTATE, POTASSIUM CHLORIDE, AND CALCIUM CHLORIDE 125 ML/HR: 600; 310; 30; 20 INJECTION, SOLUTION INTRAVENOUS at 04:05

## 2018-09-13 RX ADMIN — IBUPROFEN 800 MG: 400 TABLET ORAL at 15:32

## 2018-09-13 NOTE — ANESTHESIA PREPROCEDURE EVALUATION
Anesthetic History No history of anesthetic complications Review of Systems / Medical History Patient summary reviewed, nursing notes reviewed and pertinent labs reviewed Pulmonary Smoker Comments: Current Every Day Smoker Neuro/Psych Psychiatric history Cardiovascular Within defined limits Exercise tolerance: >4 METS Comments: Heart palpitations GI/Hepatic/Renal 
  
 
Hepatitis: type C Endo/Other Obesity Other Findings Comments: Drug addiction Physical Exam 
 
Airway Mallampati: III Neck ROM: normal range of motion Mouth opening: Normal 
 
 Cardiovascular Regular rate and rhythm,  S1 and S2 normal,  no murmur, click, rub, or gallop Dental 
 
 
  
Pulmonary Breath sounds clear to auscultation Abdominal 
GI exam deferred Other Findings Anesthetic Plan ASA: 2 Anesthesia type: CSE Induction: Intravenous Anesthetic plan and risks discussed with: Patient

## 2018-09-13 NOTE — PROCEDURES
Delivery Note    Obstetrician:  Julianna Branch MD    Assistant: none    Pre-Delivery Diagnosis: Term pregnancy    Post-Delivery Diagnosis: Living  infant(s)    Intrapartum Event: None    Procedure: Spontaneous vaginal delivery    Epidural: YES    Monitor:  Fetal Heart Tones - External and Uterine Contractions - External    Indications for instrumental delivery: none    Estimated Blood Loss:     Episiotomy: 1st degree    Laceration(s):  1st degree    Laceration(s) repair: NO    Presentation: Cephalic    Fetal Description: alvarez    Fetal Position: Occiput Anterior      Apgar - One Minute: 8    Apgar - Five Minutes: 9    Umbilical Cord: 3 vessels present             Complications:  none           Cord Blood Results:   Information for the patient's :  Sienna Ruddy Pending  [[id number]]   No results found for: PCTABR, PCTDIG, BILI, ABORH    Prenatal Labs:     Lab Results   Component Value Date/Time    HBsAg, External Neg 2018    HIV, External Non reactive 2018    Rubella, External Immune 2018    RPR, External Non Reactive 2018    Gonorrhea, External neg 2018    Chlamydia, External neg 2018    GrBStrep, External Negative 2018        Attending Attestation: I was present and scrubbed for the entire procedure    Signed By:  Julianna Branch MD     2018

## 2018-09-13 NOTE — LACTATION NOTE
This note was copied from a baby's chart. p2 mother  at 5 baby girl, [place]. Introduced [facility] services as mother was having her assessment and gown changed. Breast fed x 1 x 25 minutes at 0650. No latch score available, mother states comfortable latch/good rhythmic suckle/pause cycles. Another 5 minute feed at 0725. Hep C + mother. Provided CDC AAP recommendations. May nurse/provided she continues with out nipple trauma or bleeding. Pump and dump if this occurs, until healed. Seek LC support if so. Due to void and stool. Infant is bagged for UDS. Baby placed skin to skin, assisted mother to express gtts of free flowing colostrum as infant offered feeding. Has her 1st pump/only 5 months old. Nursed 1st baby x 8-10 weeks. [facility] for follow up. Warm Line for lactation support provided. Bedside I/0 record initiated and updated. Reviewed daily weights and recording feedings. Importance of knowing how your baby is getting enough through I/0 and daily wts. Recommend q8 hour latch scoring and assist as needed. Call prn.

## 2018-09-13 NOTE — PROGRESS NOTES
Bedside shift change report given to JEANNETTE Mace RN (oncoming nurse) by SYD Voss (offgoing nurse). Report given with SBAR.

## 2018-09-13 NOTE — ANESTHESIA PROCEDURE NOTES
CSE Block Performed by: Giuseppe Sidhu Authorized by: Giuseppe Sidhu Pre-Procedure 
preanesthetic checklist: risks and benefits discussed, site marked and timeout performed Procedure:  
Patient Position:  Seated Prep Region:  Lumbar Prep: DuraPrep Location:  L3-4 Epidural Needle:  
Needle Type:  Tuohy Needle Gauge:  17 G Injection Technique:  Loss of resistance using air Attempts:  1 Spinal Needle:  
Needle Type: Kiran Needle Gauge:  25 G Catheter:  
Catheter Type:  Flex-tip Catheter Size:  19 G Events: no blood with aspiration, no paresthesia, negative aspiration test and CSF confirmed Test Dose:  Bupivacaine 0.25% w/ epi and negative Assessment:  
Catheter Secured:  Tegaderm and tape Insertion:  Uncomplicated Patient tolerance:  Patient tolerated the procedure well with no immediate complications Hands washed and mask worn during procedure. Spinal portion: A 25 g pencil point spinal needle was placed through the Touhy x1 attempt until CSF was obtained. 0.4 mL 0.25% bupivacaine with 1:200K epinephrine was deposited into the CSF. -paresthesia.

## 2018-09-13 NOTE — PROGRESS NOTES
Call placed to Dr. Adam Payne and notified of patients dilation of 5 cm.  Also made aware that patient is being bolused  For epidural. Order to call him back after patient has received epidural and had another cervical exam.

## 2018-09-13 NOTE — PROGRESS NOTES
9604- received report and assumed care of pt.  
 
0800- pt given crackers and juice. Pt sts her legs are heavy and unable to lift. Will attempt to get up when able to move legs 4448- report to neftalirn

## 2018-09-13 NOTE — ROUTINE PROCESS
Bedside and Verbal shift change report given to JEANNETTE Mace RN (oncoming nurse) by TIFFANIE Voss (offgoing nurse). Report included the following information SBAR.

## 2018-09-13 NOTE — PROGRESS NOTES
Pt is pushing under direction of TONY Flynn and DR Dawna Ambrocio. pt is cooperative and appears to feel pressure. Pt urinated large amount of urine while pushing

## 2018-09-13 NOTE — ADT AUTH CERT NOTES
Patient Demographics     
  Patient Name [address] Sex  Address Phone    
  Jorge Gtz [phone] Female 1990 [id number] [address] [phone] (Home) [phone] (Mobile)    
   
  CSN:    
  [id number]    
   
  Admit Date: Admit Time Room Bed    
  Sep 12, 2018 11:18 AM 3167 [[id number]] 01 [[id number]]    
   
  Attending Providers     
  Provider Pager From To    
  Ethan Oliver MD  18     
   
Adm   Del  VAG  BABY GIRL Delivery Date: 2018 Delivery Time: 6:17 AM  
Delivery Type: Vaginal, Spontaneous Delivery Sex:  female Gestational Age: 38w3d 
  [place] Measurements: 
Birth Weight: 2.685 kg   
Birth Length: 19.5\"   
  
  
Delivery Clinician:  Lawrance Radha Living?:  
Delivery Location: L&D

## 2018-09-14 PROCEDURE — 74011250637 HC RX REV CODE- 250/637: Performed by: SPECIALIST

## 2018-09-14 PROCEDURE — 74011250636 HC RX REV CODE- 250/636: Performed by: SPECIALIST

## 2018-09-14 PROCEDURE — 90715 TDAP VACCINE 7 YRS/> IM: CPT | Performed by: SPECIALIST

## 2018-09-14 PROCEDURE — 65410000002 HC RM PRIVATE OB

## 2018-09-14 RX ADMIN — TETANUS TOXOID, REDUCED DIPHTHERIA TOXOID AND ACELLULAR PERTUSSIS VACCINE, ADSORBED 0.5 ML: 5; 2.5; 8; 8; 2.5 SUSPENSION INTRAMUSCULAR at 08:02

## 2018-09-14 RX ADMIN — IBUPROFEN 800 MG: 400 TABLET ORAL at 16:24

## 2018-09-14 RX ADMIN — IBUPROFEN 800 MG: 400 TABLET ORAL at 08:01

## 2018-09-14 NOTE — ROUTINE PROCESS
Bedside and Verbal shift change report given to AMBAR Ludwig RN (oncoming nurse) by Levi Steven RN (offgoing nurse).  Report included the following information SBAR.

## 2018-09-14 NOTE — PROGRESS NOTES
Post-Partum Day Number 1 Progress Note [facility] Information for the patient's :  Eliel Sanchez [[id number]] Vaginal, Spontaneous Delivery Patient doing well without significant complaint. Voiding without difficulty, normal lochia. Vitals:   
Visit Vitals  /70 (BP 1 Location: Left arm, BP Patient Position: Sitting)  Pulse 75  Temp 97.5 °F (36.4 °C)  Resp 16  
 Ht 5' 4\" (1.626 m)  Wt 90.7 kg (200 lb)  SpO2 98%  Breastfeeding No  
 BMI 34.33 kg/m2 Temp (24hrs), Av °F (36.7 °C), Min:97.5 °F (36.4 °C), Max:98.7 °F (37.1 °C) Exam:  Patient without distress. Abdomen soft, fundus firm, nontender Perineum with normal lochia noted. Lower extremities are negative for swelling, cords or tenderness. Labs:  
 
Lab Results Component Value Date/Time WBC 10.3 2018 12:13 PM  
 WBC 10.6 2018 05:47 PM  
 WBC 8.7 2018 02:08 AM  
 WBC 7.7 2018 08:13 PM  
 HGB 12.1 2018 12:13 PM  
 HGB 10.9 (L) 2018 05:47 PM  
 HGB 11.5 2018 02:08 AM  
 HGB 12.3 2018 08:13 PM  
 HCT 36.0 2018 12:13 PM  
 HCT 32.3 (L) 2018 05:47 PM  
 HCT 33.5 (L) 2018 02:08 AM  
 HCT 37.9 2018 08:13 PM  
 PLATELET 283 82/15/8291 12:13 PM  
 PLATELET 142  05:47 PM  
 PLATELET 548  02:08 AM  
 PLATELET 833  08:13 PM  
 
 
No results found for this or any previous visit (from the past 24 hour(s)). Assessment: Doing well, post partum day 1 Plan: 1. Continue routine postpartum and perineal care as well as maternal education.

## 2018-09-14 NOTE — ROUTINE PROCESS
Bedside shift change report given to LUZ MARIA Hodge (oncoming nurse) by AMBAR Hager RN (offgoing nurse). Report included the following information SBAR.

## 2018-09-15 VITALS
OXYGEN SATURATION: 98 % | HEIGHT: 64 IN | DIASTOLIC BLOOD PRESSURE: 71 MMHG | WEIGHT: 200 LBS | TEMPERATURE: 97.6 F | BODY MASS INDEX: 34.15 KG/M2 | HEART RATE: 77 BPM | SYSTOLIC BLOOD PRESSURE: 112 MMHG | RESPIRATION RATE: 16 BRPM

## 2018-09-15 PROCEDURE — 74011250637 HC RX REV CODE- 250/637: Performed by: SPECIALIST

## 2018-09-15 RX ORDER — DOCUSATE SODIUM 100 MG/1
100 CAPSULE, LIQUID FILLED ORAL 2 TIMES DAILY
Qty: 60 CAP | Refills: 2 | Status: SHIPPED | OUTPATIENT
Start: 2018-09-15 | End: 2018-10-01

## 2018-09-15 RX ORDER — IBUPROFEN 800 MG/1
800 TABLET ORAL EVERY 8 HOURS
Qty: 30 TAB | Refills: 1 | Status: SHIPPED | OUTPATIENT
Start: 2018-09-15 | End: 2018-10-01

## 2018-09-15 RX ADMIN — IBUPROFEN 800 MG: 400 TABLET ORAL at 08:51

## 2018-09-15 RX ADMIN — IBUPROFEN 800 MG: 400 TABLET ORAL at 01:01

## 2018-09-15 NOTE — DISCHARGE INSTRUCTIONS
After Your Delivery (the Postpartum Period): Care Instructions  Your Care Instructions    Congratulations on the birth of your baby. Like pregnancy, the  period can be a time of excitement, indigo, and exhaustion. You may look at your wondrous little baby and feel happy. You may also be overwhelmed by your new sleep hours and new responsibilities. At first, babies often sleep during the days and are awake at night. They do not have a pattern or routine. They may make sudden gasps, jerk themselves awake, or look like they have crossed eyes. These are all normal, and they may even make you smile. In these first weeks after delivery, try to take good care of yourself. It may take 4 to 6 weeks to feel like yourself again, and possibly longer if you had a  birth. You will likely feel very tired for several weeks. Your days will be full of ups and downs, but lots of indigo as well. Follow-up care is a key part of your treatment and safety. Be sure to make and go to all appointments, and call your doctor if you are having problems. It's also a good idea to know your test results and keep a list of the medicines you take. How can you care for yourself at home? Take care of your body after delivery  · Use pads instead of tampons for the bloody flow that may last as long as 2 weeks. · Ease cramps with ibuprofen (Advil, Motrin). · Ease soreness of hemorrhoids and the area between your vagina and rectum with ice compresses or witch hazel pads. · Ease constipation by drinking lots of fluid and eating high-fiber foods. Ask your doctor about over-the-counter stool softeners. · Cleanse yourself with a gentle squeeze of warm water from a bottle instead of wiping with toilet paper. · Take a sitz bath in warm water several times a day. · Wear a good nursing bra. Ease sore and swollen breasts with warm, wet washcloths. · If you are not breastfeeding, use ice rather than heat for breast soreness.   · Your period may not start for several months if you are breastfeeding. You may bleed more, and longer at first, than you did before you got pregnant. · Wait until you are healed (about 4 to 6 weeks) before you have sexual intercourse. Your doctor will tell you when it is okay to have sex. · Try not to travel with your baby for 5 or 6 weeks. If you take a long car trip, make frequent stops to walk around and stretch. Avoid exhaustion  · Rest every day. Try to nap when your baby naps. · Ask another adult to be with you for a few days after delivery. · Plan for  if you have other children. · Stay flexible so you can eat at odd hours and sleep when you need to. Both you and your baby are making new schedules. · Plan small trips to get out of the house. Change can make you feel less tired. · Ask for help with housework, cooking, and shopping. Remind yourself that your job is to care for your baby. Know about help for postpartum depression  · \"Baby blues\" are common for the first 1 to 2 weeks after birth. You may cry or feel sad or irritable for no reason. · Rest whenever you can. Being tired makes it harder to handle your emotions. · Go for walks with your baby. · Talk to your partner, friends, and family about your feelings. · If your symptoms last for more than a few weeks, or if you feel very depressed, ask your doctor for help. · Postpartum depression can be treated. Support groups and counseling can help. Sometimes medicine can also help. Stay healthy  · Eat healthy foods so you have more energy, make good breast milk, and lose extra baby pounds. · If you breastfeed, avoid alcohol and drugs. If you quit smoking during pregnancy, try to stay smoke-free. · Start daily exercise after 4 to 6 weeks, but rest when you feel tired. · Learn exercises to tone your belly. Do Kegel exercises to regain strength in your pelvic muscles. You can do these exercises while you stand or sit.   ¨ Squeeze the same muscles you would use to stop your urine. Your belly and thighs should not move. ¨ Hold the squeeze for 3 seconds, and then relax for 3 seconds. ¨ Start with 3 seconds. Then add 1 second each week until you are able to squeeze for 10 seconds. ¨ Repeat the exercise 10 to 15 times for each session. Do three or more sessions each day. · Find a class for new mothers and new babies that has an exercise time. · If you had a  birth, give yourself a bit more time before you exercise, and be careful. When should you call for help? Call 911 anytime you think you may need emergency care. For example, call if:    · You passed out (lost consciousness).    Call your doctor now or seek immediate medical care if:    · You have severe vaginal bleeding. This means you are passing blood clots and soaking through a pad each hour for 2 or more hours.     · You are dizzy or lightheaded, or you feel like you may faint.     · You have a fever.     · You have new belly pain, or your pain gets worse.    Watch closely for changes in your health, and be sure to contact your doctor if:    · Your vaginal bleeding seems to be getting heavier.     · You have new or worse vaginal discharge.     · You feel sad, anxious, or hopeless for more than a few days.     · You do not get better as expected. Where can you learn more? Go to http://queta-maxine.info/. Enter A461 in the search box to learn more about \"After Your Delivery (the Postpartum Period): Care Instructions. \"  Current as of: 2017  Content Version: 11.7  © 9002-9763 Healthwise, Incorporated. Care instructions adapted under license by [employer] (which disclaims liability or warranty for this information). If you have questions about a medical condition or this instruction, always ask your healthcare professional. [address] any warranty or liability for your use of this information.

## 2018-09-15 NOTE — DISCHARGE SUMMARY
Obstetrical Discharge Summary     Name: Faustina West MRN: [id number]  SSN: [id number]    YOB: 1990  Age: 33 y.o. Sex: female      Allergies: Tylenol [acetaminophen]    Admit Date: 2018    Discharge Date: 9/15/2018     Admitting Physician: Karolyn Lilly MD     Attending Physician:  Karolyn Lilly MD     * Admission Diagnoses: Leaking Fluid  Pregnancy    * Discharge Diagnoses:   Information for the patient's :  Aline Goel [[id number]]   Delivery of a 2.685 kg female infant via Vaginal, Spontaneous Delivery on 2018 at 6:17 AM  by . Apgars were 9 and 9. Additional Diagnoses:   Hospital Problems as of 9/15/2018  Date Reviewed: 2018          Codes Class Noted - Resolved POA    Pregnancy ICD-10-CM: Z34.90  ICD-9-CM: V22.2  2018 - Present Unknown             Lab Results   Component Value Date/Time    Rubella, External Immune 2018    GrBStrep, External Negative 2018    ABO,Rh A pos 2018      Immunization History   Administered Date(s) Administered    Rabies Immune Globulin 2018    Rabies- IM Fibroblast Culture 2018, 2018, 2018, 2018    Tdap 2018       * Procedures: vaginal delivery    * No surgery found *           * Discharge Condition: good    * Hospital Course: Normal hospital course following the delivery. * Disposition: Home    Discharge Medications:   Current Discharge Medication List      START taking these medications    Details   ibuprofen (MOTRIN) 800 mg tablet Take 1 Tab by mouth every eight (8) hours. Qty: 30 Tab, Refills: 1    Associated Diagnoses: Vaginal delivery      docusate sodium (COLACE) 100 mg capsule Take 1 Cap by mouth two (2) times a day for 90 days.   Qty: 60 Cap, Refills: 2    Associated Diagnoses: Vaginal delivery         CONTINUE these medications which have NOT CHANGED    Details   [id number] fum-folic ac-om3 (ONE A DAY WOMEN'S PRENATAL DHA) 28 mg iron- 800 mcg cmpk Take 1 Tab by mouth daily. * Follow-up Care/Patient Instructions:   Activity: Activity as tolerated, No driving for 2 weeks, No sex for 6 weeks, No driving while on analgesics and No heavy lifting for 6 weeks  Diet: Regular Diet  Wound Care: Keep wound clean and dry    Follow-up Information     Follow up With Details Comments Contact Info    Provider Unknown   Patient not available to ask      Wilder Romero Schedule an appointment as soon as possible for a visit in 4 weeks or , As needed, If symptoms worsen [phone]           Signed By:  Laura Bermudez MD     September 15, 2018

## 2018-09-15 NOTE — PROGRESS NOTES
Post-Partum Day Number 2 Progress/Discharge Note Patient doing well post-partum without significant complaint. Voiding without difficulty, normal lochia, positive flatus. Vitals:  Patient Vitals for the past 8 hrs: 
 BP Temp Pulse Resp  
09/15/18 0733 112/71 97.6 °F (36.4 °C) 77 16 Temp (24hrs), Av.7 °F (36.5 °C), Min:97.6 °F (36.4 °C), Max:97.8 °F (36.6 °C) Vital signs stable, afebrile. Exam:  Patient without distress. Abdomen soft, fundus firm at level of umbilicus, non tender Perineum with normal lochia noted. Lower extremities are negative for swelling, cords or tenderness. Lab/Data Review: 
BMP: No results found for: NA, K, CL, CO2, AGAP, GLU, BUN, CREA, GFRAA, GFRNA 
CMP: No results found for: NA, K, CL, CO2, AGAP, GLU, BUN, CREA, GFRAA, GFRNA, CA, MG, PHOS, ALB, TBIL, TP, ALB, GLOB, AGRAT, SGOT, ALT, GPT 
CBC: No results found for: WBC, HGB, HGBEXT, HCT, HCTEXT, PLT, PLTEXT, HGBEXT, HCTEXT, PLTEXT Assessment and Plan:  Patient appears to be having uncomplicated post-partum course. Continue routine perineal care and maternal education. Plan discharge for today with follow up in our office in 4 weeks.

## 2018-10-01 ENCOUNTER — APPOINTMENT (OUTPATIENT)
Dept: ULTRASOUND IMAGING | Age: 28
End: 2018-10-01
Attending: PHYSICIAN ASSISTANT
Payer: MEDICAID

## 2018-10-01 ENCOUNTER — HOSPITAL ENCOUNTER (EMERGENCY)
Age: 28
Discharge: HOME | End: 2018-10-01
Attending: EMERGENCY MEDICINE
Payer: MEDICAID

## 2018-10-01 VITALS
WEIGHT: 188.49 LBS | BODY MASS INDEX: 32.18 KG/M2 | RESPIRATION RATE: 18 BRPM | DIASTOLIC BLOOD PRESSURE: 51 MMHG | SYSTOLIC BLOOD PRESSURE: 122 MMHG | HEIGHT: 64 IN | HEART RATE: 96 BPM | TEMPERATURE: 97.9 F | OXYGEN SATURATION: 99 %

## 2018-10-01 DIAGNOSIS — I82.812 SAPHENOUS VEIN CLOT, LEFT: Primary | ICD-10-CM

## 2018-10-01 PROCEDURE — 93970 EXTREMITY STUDY: CPT

## 2018-10-01 PROCEDURE — 99283 EMERGENCY DEPT VISIT LOW MDM: CPT

## 2018-10-01 PROCEDURE — 74011250637 HC RX REV CODE- 250/637: Performed by: PHYSICIAN ASSISTANT

## 2018-10-01 RX ORDER — ASPIRIN 325 MG
325 TABLET ORAL ONCE
Status: COMPLETED | OUTPATIENT
Start: 2018-10-01 | End: 2018-10-01

## 2018-10-01 RX ADMIN — ASPIRIN 325 MG: 325 TABLET ORAL at 15:26

## 2018-10-01 NOTE — DISCHARGE INSTRUCTIONS
Superficial Thrombophlebitis: Care Instructions  Your Care Instructions  Superficial thrombophlebitis is inflammation in a vein where a blood clot has formed close to the surface of the skin. You may be able to feel the clot as a firm lump under the skin. The skin over the clot can become red, tender, and warm to the touch. Blood clots in veins close to the skin's surface usually are not serious and often can be treated at home. Sometimes superficial thrombophlebitis spreads to a deeper vein (deep vein thrombosis, or DVT). These deeper clots can be serious, even life-threatening. It is very important that you follow your doctor's instructions, keep all follow-up appointments, and watch for new or worsening symptoms of a clot. Follow-up care is a key part of your treatment and safety. Be sure to make and go to all appointments, and call your doctor if you are having problems. It's also a good idea to know your test results and keep a list of the medicines you take. How can you care for yourself at home? · Take your medicines exactly as prescribed. Call your doctor if you think you are having a problem with your medicine. You will get more details on the specific medicines your doctor prescribes. · Prop up the sore leg or arm on a pillow anytime you sit or lie down. Try to keep it above the level of your heart. To prevent thrombophlebitis  · Exercise. Keep blood moving in your legs to keep new clots from forming. · Get up out of bed as soon as possible after an illness or surgery. · Do not smoke. If you need help quitting, talk to your doctor about stop-smoking programs and medicines. These can increase your chances of quitting for good. · Ask your doctor about compression stockings. These may help prevent blood clots from forming in your legs. But there are different types of stockings, and they need to fit right. So your doctor will recommend what you need. When should you call for help?   Call 46 anytime you think you may need emergency care. For example, call if:    · You have sudden chest pain and shortness of breath, or you cough up blood.     · You vomit blood or what looks like coffee grounds.     · You pass maroon or very bloody stools.     · You passed out (lost consciousness).    Call your doctor now or seek immediate medical care if:    · You have signs of a blood clot, such as:  ¨ Pain in your calf, back of the knee, thigh, or groin. ¨ Redness and swelling in your leg or groin.     · You notice a new hard, red, or tender area in your leg.     · Your stools are black and tarlike or have streaks of blood.    Watch closely for changes in your health, and be sure to contact your doctor if:    · You do not get better as expected. Where can you learn more? Go to http://queta-maxine.info/. Enter [phone] in the search box to learn more about \"Superficial Thrombophlebitis: Care Instructions. \"  Current as of: November 21, 2017  Content Version: 11.7  © 3098-6844 Healthwise, Incorporated. Care instructions adapted under license by [employer] (which disclaims liability or warranty for this information). If you have questions about a medical condition or this instruction, always ask your healthcare professional. [address] any warranty or liability for your use of this information.

## 2018-10-01 NOTE — ED PROVIDER NOTES
EMERGENCY DEPARTMENT HISTORY AND PHYSICAL EXAM 
 
 
Date: 10/1/2018 Patient Name: Timothy Moyer History of Presenting Illness Chief Complaint Patient presents with  Varicose Veins c/o red and inflammed varicose vein to left leg x today History Provided By: Patient HPI: Timothy Moyer, 33 y.o. female presents ambulatory to the ED with cc of left leg pain x 2 days. Pt notes the medial aspect of her left knee is painful, swollen and red. Pt states she has a problem with varicose veins and believes that one is inflamed. Pt states she applied a warm compress and took Motrin yesterday with some relief. She notes that she has recently had 2 pregnancies. She delivered about 11 months ago and then again several weeks ago. She notes that the pain in the left leg is constant, non-radiating and worsens with palpation. There are no other complaints, changes, or physical findings at this time. Social Hx: Tobacco (< 1 ppd), EtOH (denies), Illicit drug use (denies) PCP: None Current Facility-Administered Medications Medication Dose Route Frequency Provider Last Rate Last Dose  aspirin (ASPIRIN) tablet 325 mg  325 mg Oral [address] [place], [place] Past History Past Medical History: 
Past Medical History:  
Diagnosis Date  Alcoholism ([address]  Drug addiction ([address]  Hepatitis C   
 Psychiatric disorder  Psychiatric disorder Past Surgical History: 
Past Surgical History:  
Procedure Laterality Date  HX ACL RECONSTRUCTION    
 right knee  HX OTHER SURGICAL    
 HX TONSILLECTOMY  INNER EAR SURGERY PROC UNLISTED Family History: 
Family History Problem Relation Age of Onset  Heart Disease Mother  Diabetes Father  Diabetes Maternal Grandmother  Diabetes Paternal Grandfather Social History: 
Social History Substance Use Topics  Smoking status: Current Every Day Smoker   Packs/day: 0.25  
  Smokeless tobacco: Never Used  Alcohol use No  
   Comment: No alcohol in 4 months Allergies: Allergies Allergen Reactions  Tylenol [Acetaminophen] Other (comments) Hepatitis per pt can take tylenol Review of Systems Review of Systems Constitutional: Negative for chills, diaphoresis and fever. HENT: Negative for congestion, ear pain, rhinorrhea and sore throat. Respiratory: Negative for cough and shortness of breath. Cardiovascular: Negative for chest pain. Gastrointestinal: Negative for abdominal pain, constipation, diarrhea, nausea and vomiting. Genitourinary: Negative for difficulty urinating, dysuria, frequency and hematuria. Musculoskeletal: Positive for arthralgias. Negative for myalgias. Neurological: Negative for headaches. All other systems reviewed and are negative. Physical Exam  
Physical Exam  
Constitutional: She is oriented to person, place, and time. She appears well-developed and well-nourished. No distress. 33 y.o.  female HENT:  
Head: Normocephalic and atraumatic. Eyes: Conjunctivae are normal. Right eye exhibits no discharge. Left eye exhibits no discharge. Neck: Normal range of motion. Neck supple. Cardiovascular: Normal rate, regular rhythm, normal heart sounds and intact distal pulses. No murmur heard. Pulmonary/Chest: Effort normal and breath sounds normal. No respiratory distress. Musculoskeletal:  
L LEG: Area of tenderness, swelling, and erythema to the medial aspect of the left knee. FROM of the knee, ankle, toes, and ankle. Distal NV intact. Cap refill < 3 sec. Ambulatory without difficulty. Neurological: She is alert and oriented to person, place, and time. Skin: Skin is warm and dry. She is not diaphoretic. Psychiatric: She has a normal mood and affect. Her behavior is normal.  
Nursing note and vitals reviewed. Diagnostic Study Results Labs - None Radiologic Studies -  
 DUPLEX LOWER EXT VENOUS BILAT (Final result) Result time: 10/01/18 14:30:21  
  Final result by Kendall Galan Results In (10/01/18 14:28:48)  
  Initial Result:  
  Impression:  
  impression: Clot in the distal great saphenous vein on the left. 
  
  Narrative:  
  Clinical indication:  Leg swelling, pain, DVT suspected Doppler ultrasound of the lower extremities bilaterally show no evidence for 
deep venous thrombosis on the right. Real-time, color flow  compression were 
performed. Spectral analysis was also performed. On the left clot is demonstrated with the distal great saphenous vein. Adjacent 
soft tissue swelling and tenderness. Medical Decision Making I am the first provider for this patient. I reviewed the vital signs, available nursing notes, past medical history, past surgical history, family history and social history. Vital Signs-Reviewed the patient's vital signs. Patient Vitals for the past 12 hrs: 
 Temp Pulse Resp BP SpO2  
10/01/18 1230 97.9 °F (36.6 °C) 96 18 122/51 99 % Records Reviewed: Nursing Notes Provider Notes (Medical Decision Making): DVT, thrombophlebitis, cellulitis,  
 
ED Course:  
Initial assessment performed. The patients presenting problems have been discussed, and they are in agreement with the care plan formulated and outlined with them. I have encouraged them to ask questions as they arise throughout their visit. 2:40 PM 
Case discussed with Marisela Watts. MD Analsia. Suggests the patient start taking ASA and follow up for US in 1 week. Case discussed with case management who scheduled follow up for the patient with a new PCP on 10/4/18. Critical Care Time:  
None Disposition: 
DISCHARGE NOTE: The pt is ready for discharge. The pt's signs, symptoms, diagnosis, and discharge instructions have been discussed and pt has conveyed their understanding.  The pt is to follow up as recommended or return to ER should their symptoms worsen. Plan has been discussed and pt is in agreement. PLAN: 
1. There are no discharge medications for this patient. 2.  
Follow-up Information Follow up With Details Comments Contact Chiquita Spaulding NP On 10/4/2018 Appointment at 9:00 a.m. Please arrive 30 mintues early and bring list of medications, dishcarge paperwork and insurance information [address] [facility] [phone] [place] [address] 
[phone] 3. Apply heat to the area 4. Take ASA daily Return to ED if worse Diagnosis Clinical Impression: No diagnosis found.

## 2018-10-01 NOTE — ED TRIAGE NOTES
Pt. Presents to ED today for complaints of L Leg pain and redness. Pt. States she has a varicose vein that has become inflammed. Pt. Reports applying heat and elevating the extremity last night. PT. Alert and oriented x4. Pt. Placed in position of comfort with call bell in reach.

## 2018-10-01 NOTE — PROGRESS NOTES
CM consulted re: PCP. CM introduced self, role. Pt verbalized understanding. Pt verified demographic information on chart. CM offered education re: purpose of services and education re: PCP follow up. CM offered a list of PCP providers to choose from. Pt chose to attend the [address] and has no preference for provider. CM made the soonest available new patient appointment for 10/4/18 with Ministerio Deras at 9:00 a.m. AVS updated with information. CM notified NADINE Buenrostro re: above information. No further questions or needs identified at this time. CM will continue to remain available for support, discharge planning as needed. Lesa Garcia, MSW [facility] [phone]

## 2018-10-01 NOTE — ED NOTES
Patient discharged by [facility]. Patient provided with discharge instructions Rx and instructions on follow up care. Patient out of ED ambulatory accompanied by family.

## 2018-10-09 ENCOUNTER — APPOINTMENT (OUTPATIENT)
Dept: ULTRASOUND IMAGING | Age: 28
End: 2018-10-09
Attending: NURSE PRACTITIONER
Payer: MEDICAID

## 2018-10-09 ENCOUNTER — HOSPITAL ENCOUNTER (EMERGENCY)
Age: 28
Discharge: HOME | End: 2018-10-09
Attending: EMERGENCY MEDICINE
Payer: MEDICAID

## 2018-10-09 VITALS
SYSTOLIC BLOOD PRESSURE: 117 MMHG | DIASTOLIC BLOOD PRESSURE: 75 MMHG | BODY MASS INDEX: 32.52 KG/M2 | OXYGEN SATURATION: 100 % | RESPIRATION RATE: 16 BRPM | WEIGHT: 190.48 LBS | HEART RATE: 90 BPM | TEMPERATURE: 97.5 F | HEIGHT: 64 IN

## 2018-10-09 DIAGNOSIS — I82.890 SUPERFICIAL VEIN THROMBOSIS: Primary | ICD-10-CM

## 2018-10-09 PROCEDURE — 99282 EMERGENCY DEPT VISIT SF MDM: CPT

## 2018-10-09 PROCEDURE — 93971 EXTREMITY STUDY: CPT

## 2018-10-09 NOTE — ED NOTES
Discharge instructions given to patient by [facility]. Patient verbalized understanding of discharge instructions. Pt discharged without difficulty. Pt. Discharged in stable condition via ambulation , accompanied by self.

## 2018-10-09 NOTE — DISCHARGE INSTRUCTIONS
We hope that we have addressed all of your medical concerns. The examination and treatment you received in the Emergency Department were for an emergent problem and were not intended as complete care. It is important that you follow up with your healthcare provider(s) for ongoing care. If your symptoms worsen or do not improve as expected, and you are unable to reach your usual health care provider(s), you should return to the Emergency Department. Today's healthcare is undergoing tremendous change, and patient satisfaction surveys are one of the many tools to assess the quality of medical care. You may receive a survey from the [employer] organization regarding your experience in the Emergency Department. I hope that your experience has been completely positive, particularly the medical care that I provided. As such, please participate in the survey; anything less than excellent does not meet my expectations or intentions. [place]  participate in nationally recognized quality of care measures. If your blood pressure is greater than 120/80, as reported below, we urge that you seek medical care to address the potential of high blood pressure, commonly known as hypertension. Hypertension can be hereditary or can be caused by certain medical conditions, pain, stress, or \"white coat syndrome. \"       Please make an appointment with your health care provider(s) for follow up of your Emergency Department visit. VITALS:   Patient Vitals for the past 8 hrs:   Temp Pulse Resp BP SpO2   10/09/18 1429 97.5 °F (36.4 °C) 90 16 117/75 100 %          Thank you for allowing us to provide you with medical care today. We realize that you have many choices for your emergency care needs. Please choose us in the future for any continued health care needs. Joe Bullock NP              No results found for this or any previous visit (from the past 25 hour(s)). Duplex Lower Ext Venous Left    Result Date: 10/9/2018  Indication:  left lower extremity SVT for one week. Duplex Doppler sonography of the left lower extremity was performed from the groin to the calf. There is thrombus in the Greater saphenous vein. The  femoral, femoral and popliteal veins are compressible with normal color-flow and wave forms and response to physiologic maneuvers including Valsalva and augmentation. IMPRESSION:  Persistent thrombus in the Left Greater Saphenous Vein.

## 2018-10-09 NOTE — ED PROVIDER NOTES
EMERGENCY DEPARTMENT HISTORY AND PHYSICAL EXAM 
 
 
Date: 10/9/2018 Patient Name: Zacarias Fabian History of Presenting Illness Chief Complaint Patient presents with  Referral Follow Up Dx with a blood clot in leg x1 week ago. Discharged home on ASA and warm compress. Following up to make sure blood clot is gone History Provided By: Patient HPI: Zacarias Fabian, 33 y.o. female with PMHx significant for drug addiction, hep C, ETOH and tobacco use disorder presents ambulatory from home to the ED for evaluation of left leg SVT found on US last week in the ED. She did not follow up with anyone but has taken the Aspirin. Pt denies fevers, chills, night sweats, chest pain, pressure, SOB, MAHONEY, PND, orthopnea, abdominal pain, n/v/d, melena, hematuria, dysuria, constipation, HA, dizziness, and syncope. Past History Past Medical History: 
Past Medical History:  
Diagnosis Date  Alcoholism ([facility]  Drug addiction ([facility]  Hepatitis C   
 Psychiatric disorder  Psychiatric disorder Past Surgical History: 
Past Surgical History:  
Procedure Laterality Date  HX ACL RECONSTRUCTION    
 right knee  HX OTHER SURGICAL    
 HX TONSILLECTOMY  INNER EAR SURGERY PROC UNLISTED Family History: 
Family History Problem Relation Age of Onset  Heart Disease Mother  Diabetes Father  Diabetes Maternal Grandmother  Diabetes Paternal Grandfather Social History: 
Social History Substance Use Topics  Smoking status: Current Every Day Smoker Packs/day: 0.25  Smokeless tobacco: Never Used  Alcohol use No  
   Comment: No alcohol in 4 months Allergies: Allergies Allergen Reactions  Tylenol [Acetaminophen] Other (comments) Hepatitis per pt can take tylenol Review of Systems Review of Systems Constitutional: Negative for activity change, appetite change, chills, diaphoresis, fatigue, fever and unexpected weight change. HENT: Negative for congestion, ear pain, rhinorrhea, sinus pressure, sore throat and tinnitus. Eyes: Negative for photophobia, pain, discharge and visual disturbance. Respiratory: Negative for apnea, cough, choking, chest tightness, shortness of breath, wheezing and stridor. Cardiovascular: Negative for chest pain, palpitations and leg swelling. Gastrointestinal: Negative for abdominal pain, constipation, diarrhea, nausea and vomiting. Endocrine: Negative for polydipsia, polyphagia and polyuria. Genitourinary: Negative for decreased urine volume, dyspareunia, dysuria, enuresis, flank pain, frequency, hematuria and urgency. Musculoskeletal: Negative for arthralgias, back pain, gait problem, myalgias and neck pain. Skin: Negative for color change, pallor, rash and wound. Allergic/Immunologic: Negative for immunocompromised state. Neurological: Negative for dizziness, seizures, syncope, weakness, light-headedness and headaches. Hematological: Does not bruise/bleed easily. Psychiatric/Behavioral: Negative for agitation and confusion. The patient is not nervous/anxious. Physical Exam  
Physical Exam  
Constitutional: She is oriented to person, place, and time. She appears well-developed and well-nourished. No distress. HENT:  
Head: Normocephalic. Right Ear: External ear normal.  
Left Ear: External ear normal.  
Mouth/Throat: Oropharynx is clear and moist. No oropharyngeal exudate. Eyes: Conjunctivae and EOM are normal. Pupils are equal, round, and reactive to light. Right eye exhibits no discharge. Left eye exhibits no discharge. No scleral icterus. Neck: Normal range of motion. Neck supple. No JVD present. No tracheal deviation present. No thyromegaly present. Cardiovascular: Normal rate, regular rhythm, normal heart sounds and intact distal pulses. Exam reveals no gallop and no friction rub. No murmur heard. Pulmonary/Chest: Effort normal and breath sounds normal. No stridor. No respiratory distress. She has no wheezes. She has no rales. She exhibits no tenderness. Abdominal: Soft. Bowel sounds are normal. She exhibits no distension and no mass. There is no tenderness. There is no rebound and no guarding. Musculoskeletal: Normal range of motion. She exhibits no edema or tenderness. Lymphadenopathy:  
  She has no cervical adenopathy. Neurological: She is alert and oriented to person, place, and time. She displays normal reflexes. No cranial nerve deficit. Coordination normal.  
Skin: Skin is warm and dry. No rash noted. She is not diaphoretic. No erythema. No pallor. Psychiatric: She has a normal mood and affect. Her behavior is normal. Judgment and thought content normal.  
Nursing note and vitals reviewed. Diagnostic Study Results Labs - No results found for this or any previous visit (from the past 12 hour(s)). Radiologic Studies -  
DUPLEX LOWER EXT VENOUS LEFT Final Result CT Results  (Last 48 hours) None CXR Results  (Last 48 hours) None Medical Decision Making I am the first provider for this patient. I reviewed the vital signs, available nursing notes, past medical history, past surgical history, family history and social history. Vital Signs-Reviewed the patient's vital signs. Patient Vitals for the past 12 hrs: 
 Temp Pulse Resp BP SpO2  
10/09/18 1429 97.5 °F (36.4 °C) 90 16 117/75 100 % Pulse Oximetry Analysis - 100% on RA Cardiac Monitor:  
Rate: 90 bpm 
 
Records Reviewed: Nursing Notes, Old Medical Records and Previous electrocardiograms Provider Notes (Medical Decision Making): SVT Duplex ED Course:  
Initial assessment performed. The patients presenting problems have been discussed, and they are in agreement with the care plan formulated and outlined with them.   I have encouraged them to ask questions as they arise throughout their visit. Disposition: 
Discharge to home with PCP and Vascular Surgery PLAN: 
1. There are no discharge medications for this patient. 2.  
Follow-up Information Follow up With Details Comments Contact Info [address] [facility] In 2 days  [address] [facility] [address] [place] [address] 
[phone] Josey Pedro MD In 2 days  [facility] 83. [phone] \[facility] [place] [facility]\"" EMERGENCY DEPT  As needed, If symptoms worsen [address] [place], [place] [address] [facility] 
[phone] Return to ED if worse Diagnosis Clinical Impression: 1. Superficial vein thrombosis Attestations: 
 
Sirisha Mosqueda NP 
6:28 PM

## 2018-10-16 ENCOUNTER — OFFICE VISIT (OUTPATIENT)
Dept: FAMILY MEDICINE CLINIC | Age: 28
End: 2018-10-16

## 2018-10-16 VITALS
WEIGHT: 190.4 LBS | DIASTOLIC BLOOD PRESSURE: 82 MMHG | SYSTOLIC BLOOD PRESSURE: 118 MMHG | HEIGHT: 64 IN | HEART RATE: 76 BPM | OXYGEN SATURATION: 100 % | BODY MASS INDEX: 32.5 KG/M2 | TEMPERATURE: 98.5 F

## 2018-10-16 DIAGNOSIS — H43.399: ICD-10-CM

## 2018-10-16 DIAGNOSIS — I80.9 PHLEBITIS: Primary | ICD-10-CM

## 2018-10-16 RX ORDER — ASPIRIN 325 MG
325 TABLET ORAL DAILY
COMMUNITY

## 2018-10-16 NOTE — PROGRESS NOTES
HISTORY OF PRESENT ILLNESS  Deandre Snowden is a 33 y.o. female. HPI  New patient to the practice  Seen ER for phlebitis of the legs  Given asa and told to use hot compresses  Does have signicant pmh of varicose veins  No pmh of DVT/PE    She is one month post partum, no complications  She has had floaters in her vision the entire time   Has not had vision check in years    The FamHx, SocHx, MedHx, Medication, and Allergy lists have been reviewed and updated in the chart. ROS  A comprehensive review of system was obtained and negative except findings in the HPI    Visit Vitals    /82 (BP 1 Location: Left arm, BP Patient Position: Sitting)    Pulse 76    Temp 98.5 °F (36.9 °C) (Oral)    Ht 5' 4\" (1.626 m)    Wt 190 lb 6.4 oz (86.4 kg)    LMP 12/27/2017  Comment: 10 weeks pregnant    SpO2 100%    BMI 32.68 kg/m2     Physical Exam   Constitutional: She is oriented to person, place, and time. She appears well-developed and well-nourished. Neck: No JVD present. Cardiovascular: Normal rate, regular rhythm and intact distal pulses. Exam reveals no gallop and no friction rub. No murmur heard. Pulmonary/Chest: Effort normal and breath sounds normal. No respiratory distress. She has no wheezes. Musculoskeletal: She exhibits no edema. Varicose veins of venkat legs, no active phlebitis seen   Neurological: She is alert and oriented to person, place, and time. Skin: Skin is warm. Nursing note and vitals reviewed. ASSESSMENT and PLAN  Encounter Diagnoses   Name Primary?  Phlebitis Yes    Floaters in visual field, unspecified laterality      Orders Placed This Encounter    aspirin (ASPIRIN) 325 mg tablet     Handouts given on phlebitis and floaters  Reviewed management and given referral to [facility] for vision eval    I have discussed the diagnosis with the patient and the intended plan as seen in the above orders.  The patient has received an after-visit summary and questions were answered concerning future plans. Patient conveyed understanding of the plan at the time of the visit.     Elmer Wei, MSN, ANP  10/16/2018

## 2018-10-16 NOTE — PATIENT INSTRUCTIONS
Superficial Thrombophlebitis: Care Instructions  Your Care Instructions  Superficial thrombophlebitis is inflammation in a vein where a blood clot has formed close to the surface of the skin. You may be able to feel the clot as a firm lump under the skin. The skin over the clot can become red, tender, and warm to the touch. Blood clots in veins close to the skin's surface usually are not serious and often can be treated at home. Sometimes superficial thrombophlebitis spreads to a deeper vein (deep vein thrombosis, or DVT). These deeper clots can be serious, even life-threatening. It is very important that you follow your doctor's instructions, keep all follow-up appointments, and watch for new or worsening symptoms of a clot. Follow-up care is a key part of your treatment and safety. Be sure to make and go to all appointments, and call your doctor if you are having problems. It's also a good idea to know your test results and keep a list of the medicines you take. How can you care for yourself at home? · Take your medicines exactly as prescribed. Call your doctor if you think you are having a problem with your medicine. You will get more details on the specific medicines your doctor prescribes. · Prop up the sore leg or arm on a pillow anytime you sit or lie down. Try to keep it above the level of your heart. To prevent thrombophlebitis  · Exercise. Keep blood moving in your legs to keep new clots from forming. · Get up out of bed as soon as possible after an illness or surgery. · Do not smoke. If you need help quitting, talk to your doctor about stop-smoking programs and medicines. These can increase your chances of quitting for good. · Ask your doctor about compression stockings. These may help prevent blood clots from forming in your legs. But there are different types of stockings, and they need to fit right. So your doctor will recommend what you need. When should you call for help?   Call [phone] anytime you think you may need emergency care. For example, call if:    · You have sudden chest pain and shortness of breath, or you cough up blood.     · You vomit blood or what looks like coffee grounds.     · You pass maroon or very bloody stools.     · You passed out (lost consciousness).    Call your doctor now or seek immediate medical care if:    · You have signs of a blood clot, such as:  ¨ Pain in your calf, back of the knee, thigh, or groin. ¨ Redness and swelling in your leg or groin.     · You notice a new hard, red, or tender area in your leg.     · Your stools are black and tarlike or have streaks of blood.    Watch closely for changes in your health, and be sure to contact your doctor if:    · You do not get better as expected. Where can you learn more? Go to http://queta-maxine.info/. Enter [phone] in the search box to learn more about \"Superficial Thrombophlebitis: Care Instructions. \"  Current as of: November 21, 2017  Content Version: 11.8  © 2183-8629 Healthwise, Incorporated. Care instructions adapted under license by [employer] (which disclaims liability or warranty for this information). If you have questions about a medical condition or this instruction, always ask your healthcare professional. [address] any warranty or liability for your use of this information.

## 2018-10-16 NOTE — MR AVS SNAPSHOT
[address] [address] 
[phone] Patient: Deandre Snowden MRN: [id number] :1990 Visit Information Date & Time Provider Department Dept. Phone Encounter #  
 10/16/2018  9:45 AM Fredis Lee, [address] [phone] [id number] Upcoming Health Maintenance Date Due Pneumococcal 19-64 Medium Risk (1 of 1 - PPSV23) 2010 PAP AKA CERVICAL CYTOLOGY 2012 Influenza Age 5 to Adult 2018 DTaP/Tdap/Td series (2 - Td) 2028 Allergies as of 10/16/2018  Review Complete On: 10/16/2018 By: Dalton Stanford Severity Noted Reaction Type Reactions Tylenol [Acetaminophen]  10/10/2014    Other (comments) Hepatitis per pt can take tylenol Current Immunizations  Reviewed on 2018 Name Date Rabies Immune Globulin 2018  2:57 PM  
 Rabies- IM Fibroblast Culture 2018  9:46 AM, 2018 10:48 AM, 2018  9:37 AM, 2018  3:09 PM  
 Tdap 2018  8:02 AM  
  
 Not reviewed this visit You Were Diagnosed With   
  
 Codes Comments Phlebitis    -  Primary ICD-10-CM: I80.9 ICD-9-CM: 451.9 Floaters in visual field, unspecified laterality     ICD-10-CM: R2360448 ICD-9-CM: 379.24 Vitals BP Pulse Temp Height(growth percentile) Weight(growth percentile) LMP  
 118/82 (BP 1 Location: Left arm, BP Patient Position: Sitting) 76 98.5 °F (36.9 °C) (Oral) 5' 4\" (1.626 m) 190 lb 6.4 oz (86.4 kg) 2017 SpO2 BMI OB Status Smoking Status 100% 32.68 kg/m2 Pregnant Current Every Day Smoker Vitals History BMI and BSA Data Body Mass Index Body Surface Area  
 32.68 kg/m 2 1.98 m 2 Preferred Pharmacy Pharmacy Name Phone [id number] [address] [phone] Your Updated Medication List  
  
   
 This list is accurate as of 10/16/18 10:19 AM.  Always use your most recent med list.  
  
  
  
  
 aspirin 325 mg tablet Commonly known as:  ASPIRIN Take 325 mg by mouth daily. Patient Instructions Superficial Thrombophlebitis: Care Instructions Your Care Instructions Superficial thrombophlebitis is inflammation in a vein where a blood clot has formed close to the surface of the skin. You may be able to feel the clot as a firm lump under the skin. The skin over the clot can become red, tender, and warm to the touch. Blood clots in veins close to the skin's surface usually are not serious and often can be treated at home. Sometimes superficial thrombophlebitis spreads to a deeper vein (deep vein thrombosis, or DVT). These deeper clots can be serious, even life-threatening. It is very important that you follow your doctor's instructions, keep all follow-up appointments, and watch for new or worsening symptoms of a clot. Follow-up care is a key part of your treatment and safety. Be sure to make and go to all appointments, and call your doctor if you are having problems. It's also a good idea to know your test results and keep a list of the medicines you take. How can you care for yourself at home? · Take your medicines exactly as prescribed. Call your doctor if you think you are having a problem with your medicine. You will get more details on the specific medicines your doctor prescribes. · Prop up the sore leg or arm on a pillow anytime you sit or lie down. Try to keep it above the level of your heart. To prevent thrombophlebitis · Exercise. Keep blood moving in your legs to keep new clots from forming. · Get up out of bed as soon as possible after an illness or surgery. · Do not smoke. If you need help quitting, talk to your doctor about stop-smoking programs and medicines. These can increase your chances of quitting for good. · Ask your doctor about compression stockings. These may help prevent blood clots from forming in your legs. But there are different types of stockings, and they need to fit right. So your doctor will recommend what you need. When should you call for help? Call 911 anytime you think you may need emergency care. For example, call if: 
  · You have sudden chest pain and shortness of breath, or you cough up blood.  
  · You vomit blood or what looks like coffee grounds.  
  · You pass maroon or very bloody stools.  
  · You passed out (lost consciousness).  
 Call your doctor now or seek immediate medical care if: 
  · You have signs of a blood clot, such as: 
¨ Pain in your calf, back of the knee, thigh, or groin. ¨ Redness and swelling in your leg or groin.  
  · You notice a new hard, red, or tender area in your leg.  
  · Your stools are black and tarlike or have streaks of blood.  
 Watch closely for changes in your health, and be sure to contact your doctor if: 
  · You do not get better as expected. Where can you learn more? Go to http://queta-maxine.info/. Enter [phone] in the search box to learn more about \"Superficial Thrombophlebitis: Care Instructions. \" Current as of: November 21, 2017 Content Version: 11.8 © 6032-0071 Healthwise, Incorporated. Care instructions adapted under license by [employer] (which disclaims liability or warranty for this information). If you have questions about a medical condition or this instruction, always ask your healthcare professional. [address] [address] any warranty or liability for your use of this information. Introducing [facility]! Rafael Koo introduces Informous patient portal. Now you can access parts of your medical record, email your doctor's office, and request medication refills online. 1. In your internet browser, go to https://[employer]. Tabula/[employer] 2. Click on the First Time User? Click Here link in the Sign In box. You will see the New Member Sign Up page. 3. Enter your Rumble Access Code exactly as it appears below. You will not need to use this code after youve completed the sign-up process. If you do not sign up before the expiration date, you must request a new code. · Rumble Access Code: [email or web address] Expires: 12/14/2018 11:03 AM 
 
4. Enter the last four digits of your Social Security Number (xxxx) and Date of Birth (mm/dd/yyyy) as indicated and click Submit. You will be taken to the next sign-up page. 5. Create a Rumble ID. This will be your Rumble login ID and cannot be changed, so think of one that is secure and easy to remember. 6. Create a Rumble password. You can change your password at any time. 7. Enter your Password Reset Question and Answer. This can be used at a later time if you forget your password. 8. Enter your e-mail address. You will receive e-mail notification when new information is available in [address] 9. Click Sign Up. You can now view and download portions of your medical record. 10. Click the Download Summary menu link to download a portable copy of your medical information. If you have questions, please visit the Frequently Asked Questions section of the Rumble website. Remember, Rumble is NOT to be used for urgent needs. For medical emergencies, dial 911. Now available from your iPhone and Android! Please provide this summary of care documentation to your next provider. Your primary care clinician is listed as NONE. If you have any questions after today's visit, please call [phone].

## 2019-05-13 ENCOUNTER — HOSPITAL ENCOUNTER (EMERGENCY)
Age: 29
Discharge: HOME | End: 2019-05-13
Attending: EMERGENCY MEDICINE
Payer: MEDICAID

## 2019-05-13 VITALS
DIASTOLIC BLOOD PRESSURE: 69 MMHG | HEIGHT: 64 IN | WEIGHT: 169.75 LBS | OXYGEN SATURATION: 98 % | BODY MASS INDEX: 28.98 KG/M2 | SYSTOLIC BLOOD PRESSURE: 116 MMHG | HEART RATE: 83 BPM | RESPIRATION RATE: 16 BRPM | TEMPERATURE: 98.2 F

## 2019-05-13 DIAGNOSIS — Z20.818 STREP THROAT EXPOSURE: ICD-10-CM

## 2019-05-13 DIAGNOSIS — F17.200 SMOKING ADDICTION: ICD-10-CM

## 2019-05-13 DIAGNOSIS — J02.9 SORE THROAT: Primary | ICD-10-CM

## 2019-05-13 PROCEDURE — 99281 EMR DPT VST MAYX REQ PHY/QHP: CPT

## 2019-05-13 RX ORDER — AMOXICILLIN 500 MG/1
500 TABLET, FILM COATED ORAL 2 TIMES DAILY
Qty: 20 TAB | Refills: 0 | Status: SHIPPED | OUTPATIENT
Start: 2019-05-13

## 2019-05-13 NOTE — LETTER
[address] 
\[facility] [place] [facility]\"" EMERGENCY DEPT 
[address] [place] [address] [facility] [address] [id number] 
[phone] Work/School Note Date: 5/13/2019 To Whom It May concern: 
 
Elyssa Kelly was seen and treated today in the emergency room by the following provider(s): 
Attending Provider: Debra Oliveira MD 
Physician Assistant: NADINE Lora.   
 
Elyssa Kelly may return to work on [id number]. Sincerely, NADINE Guaman

## 2019-05-13 NOTE — ED PROVIDER NOTES
EMERGENCY DEPARTMENT HISTORY AND PHYSICAL EXAM 
 
 
Date: 5/13/2019 Patient Name: Olivia Carrillo History of Presenting Illness Chief Complaint Patient presents with  Sore Throat  
  pt reports sore throat beginning yesterday, kids have strep History Provided By: Patient HPI: Olivia Carrillo, 33 y.o. female presents ambulatory to the ED with cc of 2 days of 2 out of 10 constant, aching sore throat that is worse with swallowing and may be associated with a subjective fever. She tells me her children are being treated for strep throat. There has been no cough. There are no other complaints, changes, or physical findings at this time. PCP: None Current Outpatient Medications Medication Sig Dispense Refill  amoxicillin 500 mg tab Take 500 mg by mouth two (2) times a day. 20 Tab 0  
 aspirin (ASPIRIN) 325 mg tablet Take 325 mg by mouth daily. Past History Past Medical History: 
Past Medical History:  
Diagnosis Date  [facility]ism ([facility]  [facility]  Hepatitis C   
 Psychiatric disorder  Psychiatric disorder Past Surgical History: 
Past Surgical History:  
Procedure Laterality Date  HX ACL RECONSTRUCTION    
 right knee  HX OTHER SURGICAL    
 HX TONSILLECTOMY  INNER EAR SURGERY PROC UNLISTED Family History: 
Family History Problem Relation Age of Onset  Heart Disease Mother  Diabetes Father  Diabetes Maternal Grandmother  Diabetes Paternal Grandfather Social History: 
Social History Tobacco Use  Smoking status: Current Every Day Smoker Packs/day: 0.25  Smokeless tobacco: Never Used Substance Use Topics  Alcohol use: No  
  Comment: No alcohol in 4 months  Drug use: No  
  Comment: \"I haven't used adderal for 17 months\", heroin , cocaine Allergies: Allergies Allergen Reactions  Tylenol [Acetaminophen] Other (comments) Hepatitis per pt can take tylenol Review of Systems Review of Systems Constitutional: Positive for fever. Negative for fatigue. HENT: Positive for sore throat. Negative for ear pain. Eyes: Negative for pain, redness and visual disturbance. Respiratory: Negative for cough and shortness of breath. Cardiovascular: Negative for chest pain and palpitations. Gastrointestinal: Negative for abdominal pain, nausea and vomiting. Genitourinary: Negative for dysuria, frequency and urgency. Musculoskeletal: Negative for back pain, gait problem, neck pain and neck stiffness. Skin: Negative for rash and wound. Neurological: Negative for dizziness, weakness, light-headedness, numbness and headaches. Physical Exam  
Physical Exam  
Constitutional: She is oriented to person, place, and time. She appears well-developed and well-nourished. Non-toxic appearance. No distress. HENT:  
Head: Normocephalic and atraumatic. Right Ear: External ear normal.  
Left Ear: External ear normal.  
Nose: Nose normal.  
Mouth/Throat: Uvula is midline. No trismus in the jaw. Posterior oropharyngeal erythema present. Eyes: Pupils are equal, round, and reactive to light. Conjunctivae and EOM are normal. No scleral icterus. Neck: Normal range of motion and full passive range of motion without pain. Cardiovascular: Normal rate and regular rhythm. Pulmonary/Chest: Effort normal. No accessory muscle usage. No tachypnea. No respiratory distress. She has no decreased breath sounds. She has no wheezes. Abdominal: Soft. There is no tenderness. Musculoskeletal: Normal range of motion. Lymphadenopathy:  
     Head (right side): Submandibular and tonsillar adenopathy present. Head (left side): Submandibular and tonsillar adenopathy present. Neurological: She is alert and oriented to person, place, and time. She is not disoriented. No cranial nerve deficit. GCS eye subscore is 4. GCS verbal subscore is 5. GCS motor subscore is 6. Skin: Skin is intact. No rash noted. Psychiatric: She has a normal mood and affect. Her speech is normal.  
Nursing note and vitals reviewed. Diagnostic Study Results Labs - No results found for this or any previous visit (from the past 12 hour(s)). Radiologic Studies - No orders to display CT Results  (Last 48 hours) None CXR Results  (Last 48 hours) None Medical Decision Making I am the first provider for this patient. I reviewed the vital signs, available nursing notes, past medical history, past surgical history, family history and social history. Vital Signs-Reviewed the patient's vital signs. Patient Vitals for the past 12 hrs: 
 Temp Pulse Resp BP SpO2  
05/13/19 0800 98.2 °F (36.8 °C) 83 16 116/69 98 % Pulse Oximetry Analysis - 98% on RA Records Reviewed: Nursing Notes, Old Medical Records, Previous Radiology Studies, Previous Laboratory Studies and ANA E Provider Notes (Medical Decision Making): DDx: Strep exposure, smoking addiction, viral URI, strep pharyngitis Given strep exposure and symptoms will treat empirically TOBACCO COUNSELING: 
Spent 1-5 minutes discussing the risks of smoking and the benefits of smoking cessation as well as the long term sequelae of smoking with the pt who verbalized his understanding. Reviewed strategies for success, including gradually decreasing the number of cigarettes smoked a day. ED Course:  
Initial assessment performed. The patients presenting problems have been discussed, and they are in agreement with the care plan formulated and outlined with them. I have encouraged them to ask questions as they arise throughout their visit. Disposition: 
Discharge PLAN: 
1. Current Discharge Medication List  
  
START taking these medications Details  
amoxicillin 500 mg tab Take 500 mg by mouth two (2) times a day. Qty: 20 Tab, Refills: 0  
  
  
 
2. Follow-up Information Follow up With Specialties Details Why Contact Info Patient [facility]  Schedule an appointment as soon as possible for a visit PRIMARY CARE: call to schedule follow up [address] [facility] [address]   [phone] Return to ED if worse Diagnosis Clinical Impression: 1. Sore throat 2. Strep throat exposure 3. Smoking addiction